# Patient Record
Sex: MALE | Race: WHITE | NOT HISPANIC OR LATINO | Employment: OTHER | URBAN - NONMETROPOLITAN AREA
[De-identification: names, ages, dates, MRNs, and addresses within clinical notes are randomized per-mention and may not be internally consistent; named-entity substitution may affect disease eponyms.]

---

## 2017-10-02 ENCOUNTER — TRANSCRIBE ORDERS (OUTPATIENT)
Dept: URGENT CARE | Facility: CLINIC | Age: 72
End: 2017-10-02

## 2017-10-02 ENCOUNTER — APPOINTMENT (OUTPATIENT)
Dept: RADIOLOGY | Facility: CLINIC | Age: 72
End: 2017-10-02
Payer: MEDICARE

## 2017-10-02 DIAGNOSIS — R05.9 COUGH: Primary | ICD-10-CM

## 2017-10-02 DIAGNOSIS — R05.9 COUGH: ICD-10-CM

## 2017-10-02 PROCEDURE — 71020 HB CHEST X-RAY 2VW FRONTAL&LATL: CPT

## 2018-09-11 ENCOUNTER — TRANSCRIBE ORDERS (OUTPATIENT)
Dept: LAB | Facility: CLINIC | Age: 73
End: 2018-09-11

## 2018-09-11 ENCOUNTER — APPOINTMENT (OUTPATIENT)
Dept: LAB | Facility: CLINIC | Age: 73
End: 2018-09-11
Payer: MEDICARE

## 2018-09-11 DIAGNOSIS — I10 ESSENTIAL HYPERTENSION, MALIGNANT: ICD-10-CM

## 2018-09-11 DIAGNOSIS — E78.5 HYPERLIPIDEMIA, UNSPECIFIED HYPERLIPIDEMIA TYPE: ICD-10-CM

## 2018-09-11 DIAGNOSIS — R35.1 NOCTURIA: ICD-10-CM

## 2018-09-11 DIAGNOSIS — E78.5 HYPERLIPIDEMIA, UNSPECIFIED HYPERLIPIDEMIA TYPE: Primary | ICD-10-CM

## 2018-09-11 LAB
ALBUMIN SERPL BCP-MCNC: 3.3 G/DL (ref 3.5–5)
ALP SERPL-CCNC: 47 U/L (ref 46–116)
ALT SERPL W P-5'-P-CCNC: 53 U/L (ref 12–78)
ANION GAP SERPL CALCULATED.3IONS-SCNC: 4 MMOL/L (ref 4–13)
AST SERPL W P-5'-P-CCNC: 30 U/L (ref 5–45)
BILIRUB SERPL-MCNC: 0.39 MG/DL (ref 0.2–1)
BUN SERPL-MCNC: 25 MG/DL (ref 5–25)
CALCIUM SERPL-MCNC: 8.6 MG/DL (ref 8.3–10.1)
CHLORIDE SERPL-SCNC: 105 MMOL/L (ref 100–108)
CHOLEST SERPL-MCNC: 157 MG/DL (ref 50–200)
CO2 SERPL-SCNC: 27 MMOL/L (ref 21–32)
CREAT SERPL-MCNC: 1.13 MG/DL (ref 0.6–1.3)
ERYTHROCYTE [DISTWIDTH] IN BLOOD BY AUTOMATED COUNT: 11.9 % (ref 11.6–15.1)
GFR SERPL CREATININE-BSD FRML MDRD: 65 ML/MIN/1.73SQ M
GLUCOSE SERPL-MCNC: 75 MG/DL (ref 65–140)
HCT VFR BLD AUTO: 44.4 % (ref 36.5–49.3)
HDLC SERPL-MCNC: 32 MG/DL (ref 40–60)
HGB BLD-MCNC: 15.1 G/DL (ref 12–17)
LDLC SERPL DIRECT ASSAY-MCNC: 69 MG/DL (ref 0–100)
MCH RBC QN AUTO: 33.8 PG (ref 26.8–34.3)
MCHC RBC AUTO-ENTMCNC: 34 G/DL (ref 31.4–37.4)
MCV RBC AUTO: 99 FL (ref 82–98)
PLATELET # BLD AUTO: 229 THOUSANDS/UL (ref 149–390)
PMV BLD AUTO: 10.3 FL (ref 8.9–12.7)
POTASSIUM SERPL-SCNC: 5 MMOL/L (ref 3.5–5.3)
PROT SERPL-MCNC: 7.1 G/DL (ref 6.4–8.2)
PSA SERPL-MCNC: 1.6 NG/ML (ref 0–4)
RBC # BLD AUTO: 4.47 MILLION/UL (ref 3.88–5.62)
SODIUM SERPL-SCNC: 136 MMOL/L (ref 136–145)
TRIGL SERPL-MCNC: 405 MG/DL
WBC # BLD AUTO: 5.37 THOUSAND/UL (ref 4.31–10.16)

## 2018-09-11 PROCEDURE — 80061 LIPID PANEL: CPT

## 2018-09-11 PROCEDURE — 83721 ASSAY OF BLOOD LIPOPROTEIN: CPT

## 2018-09-11 PROCEDURE — 85027 COMPLETE CBC AUTOMATED: CPT

## 2018-09-11 PROCEDURE — 80053 COMPREHEN METABOLIC PANEL: CPT

## 2018-09-11 PROCEDURE — 84153 ASSAY OF PSA TOTAL: CPT

## 2018-09-11 PROCEDURE — 36415 COLL VENOUS BLD VENIPUNCTURE: CPT

## 2020-09-14 PROCEDURE — 88305 TISSUE EXAM BY PATHOLOGIST: CPT | Performed by: PATHOLOGY

## 2021-01-20 ENCOUNTER — IMMUNIZATIONS (OUTPATIENT)
Dept: FAMILY MEDICINE CLINIC | Facility: HOSPITAL | Age: 76
End: 2021-01-20

## 2021-01-20 DIAGNOSIS — Z23 ENCOUNTER FOR IMMUNIZATION: Primary | ICD-10-CM

## 2021-01-20 PROCEDURE — 0011A SARS-COV-2 / COVID-19 MRNA VACCINE (MODERNA) 100 MCG: CPT

## 2021-01-20 PROCEDURE — 91301 SARS-COV-2 / COVID-19 MRNA VACCINE (MODERNA) 100 MCG: CPT

## 2021-02-17 ENCOUNTER — IMMUNIZATIONS (OUTPATIENT)
Dept: FAMILY MEDICINE CLINIC | Facility: HOSPITAL | Age: 76
End: 2021-02-17

## 2021-02-17 DIAGNOSIS — Z23 ENCOUNTER FOR IMMUNIZATION: Primary | ICD-10-CM

## 2021-02-17 PROCEDURE — 0012A SARS-COV-2 / COVID-19 MRNA VACCINE (MODERNA) 100 MCG: CPT

## 2021-02-17 PROCEDURE — 91301 SARS-COV-2 / COVID-19 MRNA VACCINE (MODERNA) 100 MCG: CPT

## 2021-03-01 ENCOUNTER — APPOINTMENT (OUTPATIENT)
Dept: RADIOLOGY | Facility: MEDICAL CENTER | Age: 76
End: 2021-03-01
Payer: MEDICARE

## 2021-03-01 DIAGNOSIS — M54.2 NECK PAIN: ICD-10-CM

## 2021-03-01 PROCEDURE — 72050 X-RAY EXAM NECK SPINE 4/5VWS: CPT

## 2021-06-08 ENCOUNTER — APPOINTMENT (EMERGENCY)
Dept: CT IMAGING | Facility: HOSPITAL | Age: 76
End: 2021-06-08
Payer: MEDICARE

## 2021-06-08 ENCOUNTER — APPOINTMENT (EMERGENCY)
Dept: RADIOLOGY | Facility: HOSPITAL | Age: 76
End: 2021-06-08
Payer: MEDICARE

## 2021-06-08 ENCOUNTER — HOSPITAL ENCOUNTER (EMERGENCY)
Facility: HOSPITAL | Age: 76
End: 2021-06-08
Attending: EMERGENCY MEDICINE | Admitting: EMERGENCY MEDICINE
Payer: MEDICARE

## 2021-06-08 ENCOUNTER — HOSPITAL ENCOUNTER (INPATIENT)
Facility: HOSPITAL | Age: 76
LOS: 2 days | Discharge: HOME | DRG: 185 | End: 2021-06-10
Attending: SURGERY | Admitting: SURGERY
Payer: MEDICARE

## 2021-06-08 VITALS
RESPIRATION RATE: 20 BRPM | OXYGEN SATURATION: 96 % | BODY MASS INDEX: 27.81 KG/M2 | TEMPERATURE: 97.5 F | SYSTOLIC BLOOD PRESSURE: 126 MMHG | DIASTOLIC BLOOD PRESSURE: 67 MMHG | HEIGHT: 66 IN | WEIGHT: 173.06 LBS | HEART RATE: 61 BPM

## 2021-06-08 DIAGNOSIS — S22.41XA CLOSED FRACTURE OF MULTIPLE RIBS OF RIGHT SIDE, INITIAL ENCOUNTER: Primary | ICD-10-CM

## 2021-06-08 DIAGNOSIS — S27.321A CONTUSION OF RIGHT LUNG, INITIAL ENCOUNTER: Primary | ICD-10-CM
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
## 2021-06-08 DIAGNOSIS — S22.41XA CLOSED FRACTURE OF MULTIPLE RIBS OF RIGHT SIDE, INITIAL ENCOUNTER: ICD-10-CM

## 2021-06-08 PROBLEM — G89.11 ACUTE PAIN DUE TO TRAUMA: Status: ACTIVE | Noted: 2021-06-08

## 2021-06-08 LAB
ANION GAP SERPL CALCULATED.3IONS-SCNC: 7 MMOL/L (ref 4–13)
APTT PPP: 24 SECONDS (ref 23–37)
BASOPHILS # BLD AUTO: 0.03 THOUSANDS/ΜL (ref 0–0.1)
BASOPHILS NFR BLD AUTO: 1 % (ref 0–1)
BUN SERPL-MCNC: 29 MG/DL (ref 5–25)
CALCIUM SERPL-MCNC: 8.8 MG/DL (ref 8.3–10.1)
CHLORIDE SERPL-SCNC: 105 MMOL/L (ref 100–108)
CO2 SERPL-SCNC: 27 MMOL/L (ref 21–32)
CREAT SERPL-MCNC: 1.23 MG/DL (ref 0.6–1.3)
EOSINOPHIL # BLD AUTO: 0.14 THOUSAND/ΜL (ref 0–0.61)
EOSINOPHIL NFR BLD AUTO: 3 % (ref 0–6)
ERYTHROCYTE [DISTWIDTH] IN BLOOD BY AUTOMATED COUNT: 12.4 % (ref 11.6–15.1)
GFR SERPL CREATININE-BSD FRML MDRD: 58 ML/MIN/1.73SQ M
GLUCOSE SERPL-MCNC: 185 MG/DL (ref 65–140)
HCT VFR BLD AUTO: 44.6 % (ref 36.5–49.3)
HGB BLD-MCNC: 15 G/DL (ref 12–17)
IMM GRANULOCYTES # BLD AUTO: 0.02 THOUSAND/UL (ref 0–0.2)
IMM GRANULOCYTES NFR BLD AUTO: 0 % (ref 0–2)
INR PPP: 0.93 (ref 0.84–1.19)
LYMPHOCYTES # BLD AUTO: 1.39 THOUSANDS/ΜL (ref 0.6–4.47)
LYMPHOCYTES NFR BLD AUTO: 27 % (ref 14–44)
MCH RBC QN AUTO: 33 PG (ref 26.8–34.3)
MCHC RBC AUTO-ENTMCNC: 33.6 G/DL (ref 31.4–37.4)
MCV RBC AUTO: 98 FL (ref 82–98)
MONOCYTES # BLD AUTO: 0.56 THOUSAND/ΜL (ref 0.17–1.22)
MONOCYTES NFR BLD AUTO: 11 % (ref 4–12)
NEUTROPHILS # BLD AUTO: 3.06 THOUSANDS/ΜL (ref 1.85–7.62)
NEUTS SEG NFR BLD AUTO: 58 % (ref 43–75)
NRBC BLD AUTO-RTO: 0 /100 WBCS
PLATELET # BLD AUTO: 221 THOUSANDS/UL (ref 149–390)
PMV BLD AUTO: 9.9 FL (ref 8.9–12.7)
POTASSIUM SERPL-SCNC: 4.3 MMOL/L (ref 3.5–5.3)
PROTHROMBIN TIME: 12.3 SECONDS (ref 11.6–14.5)
RBC # BLD AUTO: 4.54 MILLION/UL (ref 3.88–5.62)
SARS-COV-2 RNA RESP QL NAA+PROBE: NEGATIVE
SODIUM SERPL-SCNC: 139 MMOL/L (ref 136–145)
WBC # BLD AUTO: 5.2 THOUSAND/UL (ref 4.31–10.16)

## 2021-06-08 PROCEDURE — 85025 COMPLETE CBC W/AUTO DIFF WBC: CPT | Performed by: EMERGENCY MEDICINE

## 2021-06-08 PROCEDURE — 85730 THROMBOPLASTIN TIME PARTIAL: CPT | Performed by: EMERGENCY MEDICINE

## 2021-06-08 PROCEDURE — 71045 X-RAY EXAM CHEST 1 VIEW: CPT

## 2021-06-08 PROCEDURE — 99285 EMERGENCY DEPT VISIT HI MDM: CPT

## 2021-06-08 PROCEDURE — 90715 TDAP VACCINE 7 YRS/> IM: CPT | Performed by: PHYSICIAN ASSISTANT

## 2021-06-08 PROCEDURE — 80048 BASIC METABOLIC PNL TOTAL CA: CPT | Performed by: EMERGENCY MEDICINE

## 2021-06-08 PROCEDURE — U0003 INFECTIOUS AGENT DETECTION BY NUCLEIC ACID (DNA OR RNA); SEVERE ACUTE RESPIRATORY SYNDROME CORONAVIRUS 2 (SARS-COV-2) (CORONAVIRUS DISEASE [COVID-19]), AMPLIFIED PROBE TECHNIQUE, MAKING USE OF HIGH THROUGHPUT TECHNOLOGIES AS DESCRIBED BY CMS-2020-01-R: HCPCS | Performed by: EMERGENCY MEDICINE

## 2021-06-08 PROCEDURE — 99222 1ST HOSP IP/OBS MODERATE 55: CPT | Performed by: SURGERY

## 2021-06-08 PROCEDURE — 74177 CT ABD & PELVIS W/CONTRAST: CPT

## 2021-06-08 PROCEDURE — 99222 1ST HOSP IP/OBS MODERATE 55: CPT | Performed by: NURSE PRACTITIONER

## 2021-06-08 PROCEDURE — U0005 INFEC AGEN DETEC AMPLI PROBE: HCPCS | Performed by: EMERGENCY MEDICINE

## 2021-06-08 PROCEDURE — 96374 THER/PROPH/DIAG INJ IV PUSH: CPT

## 2021-06-08 PROCEDURE — 85610 PROTHROMBIN TIME: CPT | Performed by: EMERGENCY MEDICINE

## 2021-06-08 PROCEDURE — 36415 COLL VENOUS BLD VENIPUNCTURE: CPT | Performed by: EMERGENCY MEDICINE

## 2021-06-08 PROCEDURE — 96375 TX/PRO/DX INJ NEW DRUG ADDON: CPT

## 2021-06-08 PROCEDURE — 99291 CRITICAL CARE FIRST HOUR: CPT | Performed by: EMERGENCY MEDICINE

## 2021-06-08 PROCEDURE — 71260 CT THORAX DX C+: CPT

## 2021-06-08 RX ORDER — METHOCARBAMOL 500 MG/1
500 TABLET, FILM COATED ORAL EVERY 6 HOURS SCHEDULED
Status: DISCONTINUED | OUTPATIENT
Start: 2021-06-08 | End: 2021-06-08

## 2021-06-08 RX ORDER — ONDANSETRON 2 MG/ML
4 INJECTION INTRAMUSCULAR; INTRAVENOUS EVERY 4 HOURS PRN
Status: DISCONTINUED | OUTPATIENT
Start: 2021-06-08 | End: 2021-06-10 | Stop reason: HOSPADM

## 2021-06-08 RX ORDER — OXYCODONE HYDROCHLORIDE 5 MG/1
2.5 TABLET ORAL EVERY 4 HOURS PRN
Status: DISCONTINUED | OUTPATIENT
Start: 2021-06-08 | End: 2021-06-10 | Stop reason: HOSPADM

## 2021-06-08 RX ORDER — GABAPENTIN 100 MG/1
100 CAPSULE ORAL
Status: DISCONTINUED | OUTPATIENT
Start: 2021-06-08 | End: 2021-06-10 | Stop reason: HOSPADM

## 2021-06-08 RX ORDER — POLYETHYLENE GLYCOL 3350 17 G/17G
17 POWDER, FOR SOLUTION ORAL DAILY PRN
Status: DISCONTINUED | OUTPATIENT
Start: 2021-06-08 | End: 2021-06-10 | Stop reason: HOSPADM

## 2021-06-08 RX ORDER — MELATONIN
2000 DAILY
Status: DISCONTINUED | OUTPATIENT
Start: 2021-06-08 | End: 2021-06-10 | Stop reason: HOSPADM

## 2021-06-08 RX ORDER — LISINOPRIL 20 MG/1
20 TABLET ORAL DAILY
Status: DISCONTINUED | OUTPATIENT
Start: 2021-06-08 | End: 2021-06-10 | Stop reason: HOSPADM

## 2021-06-08 RX ORDER — METHOCARBAMOL 500 MG/1
500 TABLET, FILM COATED ORAL EVERY 8 HOURS SCHEDULED
Status: DISCONTINUED | OUTPATIENT
Start: 2021-06-08 | End: 2021-06-10 | Stop reason: HOSPADM

## 2021-06-08 RX ORDER — ACETAMINOPHEN 325 MG/1
975 TABLET ORAL EVERY 8 HOURS SCHEDULED
Status: DISCONTINUED | OUTPATIENT
Start: 2021-06-08 | End: 2021-06-10 | Stop reason: HOSPADM

## 2021-06-08 RX ORDER — PANTOPRAZOLE SODIUM 40 MG/1
40 TABLET, DELAYED RELEASE ORAL
Status: DISCONTINUED | OUTPATIENT
Start: 2021-06-09 | End: 2021-06-10 | Stop reason: HOSPADM

## 2021-06-08 RX ORDER — FENTANYL CITRATE 50 UG/ML
75 INJECTION, SOLUTION INTRAMUSCULAR; INTRAVENOUS ONCE
Status: COMPLETED | OUTPATIENT
Start: 2021-06-08 | End: 2021-06-08

## 2021-06-08 RX ORDER — LIDOCAINE 50 MG/G
1 PATCH TOPICAL DAILY
Status: COMPLETED | OUTPATIENT
Start: 2021-06-08 | End: 2021-06-09

## 2021-06-08 RX ORDER — GABAPENTIN 100 MG/1
100 CAPSULE ORAL 3 TIMES DAILY
Status: DISCONTINUED | OUTPATIENT
Start: 2021-06-08 | End: 2021-06-08

## 2021-06-08 RX ORDER — AMLODIPINE BESYLATE 5 MG/1
5 TABLET ORAL DAILY
Status: DISCONTINUED | OUTPATIENT
Start: 2021-06-08 | End: 2021-06-10 | Stop reason: HOSPADM

## 2021-06-08 RX ORDER — AMOXICILLIN 250 MG
2 CAPSULE ORAL DAILY
Status: DISCONTINUED | OUTPATIENT
Start: 2021-06-09 | End: 2021-06-10 | Stop reason: HOSPADM

## 2021-06-08 RX ORDER — HYDROMORPHONE HCL IN WATER/PF 6 MG/30 ML
0.2 PATIENT CONTROLLED ANALGESIA SYRINGE INTRAVENOUS
Status: DISCONTINUED | OUTPATIENT
Start: 2021-06-08 | End: 2021-06-09

## 2021-06-08 RX ORDER — OXYCODONE HYDROCHLORIDE 5 MG/1
5 TABLET ORAL EVERY 4 HOURS PRN
Status: DISCONTINUED | OUTPATIENT
Start: 2021-06-08 | End: 2021-06-10 | Stop reason: HOSPADM

## 2021-06-08 RX ORDER — HYDROMORPHONE HCL/PF 1 MG/ML
1 SYRINGE (ML) INJECTION ONCE
Status: COMPLETED | OUTPATIENT
Start: 2021-06-08 | End: 2021-06-08

## 2021-06-08 RX ADMIN — LISINOPRIL 20 MG: 20 TABLET ORAL at 18:20

## 2021-06-08 RX ADMIN — METHOCARBAMOL 500 MG: 500 TABLET ORAL at 14:33

## 2021-06-08 RX ADMIN — ACETAMINOPHEN 975 MG: 325 TABLET, FILM COATED ORAL at 21:06

## 2021-06-08 RX ADMIN — ACETAMINOPHEN 975 MG: 325 TABLET, FILM COATED ORAL at 14:33

## 2021-06-08 RX ADMIN — OXYCODONE HYDROCHLORIDE 5 MG: 5 TABLET ORAL at 18:32

## 2021-06-08 RX ADMIN — LIDOCAINE 5% 1 PATCH: 700 PATCH TOPICAL at 14:33

## 2021-06-08 RX ADMIN — GABAPENTIN 100 MG: 100 CAPSULE ORAL at 21:07

## 2021-06-08 RX ADMIN — TETANUS TOXOID, REDUCED DIPHTHERIA TOXOID AND ACELLULAR PERTUSSIS VACCINE, ADSORBED 0.5 ML: 5; 2.5; 8; 8; 2.5 SUSPENSION INTRAMUSCULAR at 14:35

## 2021-06-08 RX ADMIN — FENTANYL CITRATE 75 MCG: 50 INJECTION, SOLUTION INTRAMUSCULAR; INTRAVENOUS at 10:21

## 2021-06-08 RX ADMIN — METHOCARBAMOL 500 MG: 500 TABLET, FILM COATED ORAL at 21:07

## 2021-06-08 RX ADMIN — HYDROMORPHONE HYDROCHLORIDE 1 MG: 1 INJECTION, SOLUTION INTRAMUSCULAR; INTRAVENOUS; SUBCUTANEOUS at 11:13

## 2021-06-08 RX ADMIN — ENOXAPARIN SODIUM 30 MG: 30 INJECTION, SOLUTION INTRAVENOUS; SUBCUTANEOUS at 14:33

## 2021-06-08 RX ADMIN — IOHEXOL 100 ML: 350 INJECTION, SOLUTION INTRAVENOUS at 10:38

## 2021-06-08 NOTE — H&P
[facility]  H&P- Juany Frost 1945, 70 y o  male MRN: [id number]  Unit/Bed#: ED 28 Encounter: [id number]  Primary Care Provider: Bjorn Mcgrath MD   Date and time admitted to hospital: 6/8/2021  1:19 PM    Fall  Assessment & Plan  - Status post mechanical fall approximately 4 feet onto right side with the below noted injuries  - Fall precautions   - PT and OT evaluation and treatment as indicated  - Case Management consultation for disposition planning  * Closed fracture of multiple ribs of right side  Assessment & Plan  - Multiple right-sided rib fractures (nondisplaced 7th & mildly displaced 9-10), present on admission   - Continue rib fracture protocol   - Continue to encourage incentive spirometer use and adequate pulmonary hygiene  Currently pulling ** mL on I S   - Continue multimodal analgesic regimen  Appreciate APS evaluation and recommendations   - Supplemental oxygen via nasal cannula as needed to maintain saturations greater than or equal to 94%  - Plan for repeat chest x-ray on 6/9/2021    - PT and OT evaluation and treatment as indicated  H&P Exam - Trauma   Juany Frost 70 y o  male MRN: [id number]  Unit/Bed#: ED 28 Encounter: [id number]    Assessment/Plan   Trauma Alert: Evaluation; Transfer from [place] [address]  of Arrival: Ambulance  Trauma Team: Attending Dr Tawnya Molina and Lucho Baker PA-C  Consultants:   Acute Pain Service contacted on 06/08/2021 at 1:50 p m  Trauma Active Problems and Trauma Plan: See above  Chief Complaint:  "My right side hurts "    History of Present Illness   HPI:  Juany Frost is a 70 y o  male who presented to [facility] with right chest wall 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  He was working with a hay bale when it kicked out from underneath him and he fell approximately 4 ft onto some farm equipment striking his right side  He denied head strike or loss of consciousness    He was able to ambulate and get to his neighbor's house for help  Since his fall, he has had pain in his right side and lateral back as well as occasional shortness of breath  He denied any other traumatic event or other complaints at this time  Mechanism:Fall    Review of Systems   Constitutional: Negative for activity change, appetite change, chills, fatigue, fever and unexpected weight change  HENT: Negative for congestion, ear pain, facial swelling and sore throat  Eyes: Negative  Negative for photophobia, pain and visual disturbance  Respiratory: Negative  Negative for cough, chest tightness, shortness of breath and wheezing  Cardiovascular: Positive for chest pain (Right lateral and posterior chest wall pain)  Negative for leg swelling  Gastrointestinal: Negative  Negative for abdominal distention, abdominal pain, constipation, diarrhea, nausea and vomiting  Endocrine: Negative  Genitourinary: Negative  Negative for difficulty urinating, dysuria, flank pain, frequency and hematuria  Musculoskeletal: Positive for arthralgias (Mild right shoulder pain, may be chronic)  Negative for back pain and neck pain  Skin: Positive for wound (Right flank/lateral back abrasion)  Negative for color change, pallor and rash  Allergic/Immunologic: Negative  Neurological: Negative  Negative for dizziness, syncope, weakness, light-headedness, numbness and headaches  Hematological: Negative  Psychiatric/Behavioral: Negative  Negative for agitation, confusion and self-injury  The patient is not nervous/anxious  12-point, complete review of systems was reviewed and negative except as stated above         Historical Information   Efforts to obtain history included the following sources: other medical personnel, obtained from other records    Past Medical History:   Diagnosis Date    GERD (gastroesophageal reflux disease)     Hypertension      Past Surgical History:   Procedure Laterality Date  KNEE ARTHROSCOPY       Social History   Social History     Substance and Sexual Activity   Alcohol Use Yes    Comment: 1-2 drinks a month     Social History     Substance and Sexual Activity   Drug Use Never     Social History     Tobacco Use   Smoking Status Never Smoker   Smokeless Tobacco Never Used     E-Cigarette/Vaping    E-Cigarette Use Never User      E-Cigarette/Vaping Substances    Nicotine No     THC No     CBD No     Flavoring No     Other No     Unknown No      Immunization History   Administered Date(s) Administered    SARS-CoV-2 / COVID-19 mRNA IM (Michael Hamilton) 01/20/2021, 02/17/2021     Last Tetanus: Unknown, will update  Family History: Non-contributory  Unable to obtain/limited by N/A      Meds/Allergies   all current active meds have been reviewed and current meds:   No current facility-administered medications for this encounter  Allergies   Allergen Reactions    Sulfa Antibiotics      As a child         PHYSICAL EXAM    PE limited by: N/A    Objective   Vitals:   First set:      Primary Survey:   (A) Airway:  Patent and intact  (B) Breathing:  Clear and equal bilaterally  (C) Circulation: Pulses:   normal, carotid  2/4, pedal  2/4, radial  2/4 and femoral  2/4  (D) Disabliity:  GCS Total:  15, Eye Opening:   Spontaneous = 4, Motor Response: Obeys commands = 6 and Verbal Response:  Oriented = 5  (E) Expose:  Completed    Secondary Survey: (Click on Physical Exam tab above)  Physical Exam  Vitals signs and nursing note reviewed  Constitutional:       General: He is awake  He is not in acute distress  Appearance: Normal appearance  He is not ill-appearing, toxic-appearing or diaphoretic  Interventions: He is not intubated  Comments: Patient did appear mildly uncomfortable secondary to right chest wall pain  HENT:      Head: Normocephalic and atraumatic  No abrasion, contusion or laceration  Jaw: There is normal jaw occlusion        Right Ear: Hearing and external ear normal  No tenderness  Left Ear: Hearing and external ear normal  No tenderness  Nose: Nose normal  No nasal deformity, septal deviation, signs of injury, laceration, nasal tenderness or congestion  Mouth/Throat:      Mouth: Mucous membranes are moist  No injury  Dentition: Normal dentition  Pharynx: Oropharynx is clear  Eyes:      General: Lids are normal       Extraocular Movements: Extraocular movements intact  Conjunctiva/sclera: Conjunctivae normal       Pupils: Pupils are equal, round, and reactive to light  Comments:  Pupils were 3 mm, equal, round and reactive bilaterally   Neck:      Musculoskeletal: Normal range of motion and neck supple  Normal range of motion  No injury, spinous process tenderness or muscular tenderness  Cardiovascular:      Rate and Rhythm: Normal rate and regular rhythm  Pulses: Normal pulses  Carotid pulses are 2+ on the right side and 2+ on the left side  Radial pulses are 2+ on the right side and 2+ on the left side  Femoral pulses are 2+ on the right side and 2+ on the left side  Dorsalis pedis pulses are 2+ on the right side and 2+ on the left side  Heart sounds: Normal heart sounds  No murmur  No friction rub  No gallop  Pulmonary:      Effort: Pulmonary effort is normal  No tachypnea, bradypnea, accessory muscle usage or respiratory distress  He is not intubated  Breath sounds: Normal breath sounds and air entry  No stridor or decreased air movement  No decreased breath sounds, wheezing, rhonchi or rales  Chest:      Chest wall: Tenderness (Right lateral and posterior chest wall tenderness) present  No lacerations, deformity, swelling or crepitus  Abdominal:      General: Abdomen is flat  Bowel sounds are normal  There is no distension  Palpations: Abdomen is soft  Tenderness: There is no abdominal tenderness  There is no guarding or rebound     Musculoskeletal: Normal range of motion  General: No swelling, tenderness, deformity or signs of injury  Right shoulder: He exhibits normal range of motion, no tenderness, no swelling and no deformity  Cervical back: He exhibits no tenderness, no swelling, no deformity and no pain  Thoracic back: He exhibits no tenderness, no swelling, no deformity and no pain  Lumbar back: He exhibits no tenderness, no swelling, no deformity and no pain  Right lower leg: No edema  Left lower leg: No edema  Skin:     General: Skin is warm and dry  Coloration: Skin is not jaundiced or pale  Findings: Abrasion (Right flank and lateral back abrasions with minimal tenderness over posterior chest wall) present  No bruising, erythema, lesion or rash  Neurological:      General: No focal deficit present  Mental Status: He is alert and oriented to person, place, and time  Mental status is at baseline  GCS: GCS eye subscore is 4  GCS verbal subscore is 5  GCS motor subscore is 6  Sensory: Sensation is intact  No sensory deficit  Motor: Motor function is intact  No weakness  Psychiatric:         Mood and Affect: Mood normal          Behavior: Behavior is cooperative           Invasive Devices     Peripheral Intravenous Line            Peripheral IV 06/08/21 Right Antecubital less than 1 day                Lab Results:   Results: I have personally reviewed pertinent reports   , BMP/CMP:   Lab Results   Component Value Date    SODIUM 139 06/08/2021    K 4 3 06/08/2021     06/08/2021    CO2 27 06/08/2021    BUN 29 (H) 06/08/2021    CREATININE 1 23 06/08/2021    CALCIUM 8 8 06/08/2021    EGFR 58 06/08/2021   , CBC:   Lab Results   Component Value Date    WBC 5 20 06/08/2021    HGB 15 0 06/08/2021    HCT 44 6 06/08/2021    MCV 98 06/08/2021     06/08/2021    MCH 33 0 06/08/2021    MCHC 33 6 06/08/2021    RDW 12 4 06/08/2021    MPV 9 9 06/08/2021    NRBC 0 06/08/2021    and Coagulation: Lab Results   Component Value Date    INR 0 93 06/08/2021     Imaging/EKG Studies: Results: I have personally reviewed pertinent reports     and I have personally reviewed pertinent films in PACS  Other Studies: N/A    Code Status: No Order  Advance Directive and Living Will:      Power of :    POLST:

## 2021-06-08 NOTE — ASSESSMENT & PLAN NOTE
- Multiple right-sided rib fractures (nondisplaced 7th & mildly displaced 9-10), present on admission   - Continue rib fracture protocol   - Continue to encourage incentive spirometer use and adequate pulmonary hygiene  Currently pulling 1,250 mL on I S   - Continue multimodal analgesic regimen  Appreciate APS evaluation and recommendations   - Supplemental oxygen via nasal cannula as needed to maintain saturations greater than or equal to 94%  - Plan for repeat chest x-ray on 6/9/2021    - PT and OT evaluation and treatment as indicated

## 2021-06-08 NOTE — ED PROVIDER NOTES
Emergency Department Trauma Note  Grover Howard 70 y o  male MRN: [id number]  Unit/Bed#: RM10/RM10 Encounter: [id number]      Trauma Alert: Trauma Acuity: Trauma Evaluation  Model of Arrival:   via    Trauma Team: Current Providers  Attending Provider: Shilpi Phillips DO  Registered Nurse: Eliana Chandler RN  Consultants: None      History of Present Illness     Chief Complaint:   Chief Complaint   Patient presents with    Trauma     patient reports falling about 4foot off a haybail  he fell onto right side  pain in right ribs and flank area with abrasions  HPI:  Grover Howard is a 70 y o  male who presents with right-sided back and flank pain after a hay bale he was standing on kicked out causing him to fall approximately 4 ft onto farm machinery  He denies head injury and loss of consciousness  He had no prodromal symptoms  Fall was witnessed  He has abrasions over his right flank significant pain from the right shoulder blade down to his waist   He denies any other associated symptoms at this time  Mechanism:Details of Incident: patient fell 4 foot onto right side   pain in left ribs and flank Injury Date: 06/08/21 Injury Time: 0945 Injury Occurence Location - [address] [place] [address] Way: [facility]      History provided by:  Patient and medical records  Fall  Mechanism of injury: fall    Injury location:  Torso  Torso injury location:  R flank and R chest  Incident location:  Around machinery  Time since incident:  30 minutes  Arrived directly from scene: yes    Fall:     Height of fall:  4-6 feet    Entrapped after fall: no    Protective equipment: none    Suspicion of alcohol use: no    Suspicion of drug use: no    Prior to arrival data:     Blood loss:  None    Responsiveness at scene:  Alert    Orientation at scene:  Person, place, situation and time    Loss of consciousness: no      Amnesic to event: no      Airway interventions:  None    Breathing interventions:  None    Immobilization:  None    Airway condition since incident:  Stable    Breathing condition since incident:  Stable    Circulation condition since incident:  Stable    Mental status condition since incident:  Stable    Disability condition since incident:  Stable  Associated symptoms: back pain (right flank), chest pain (right lateral) and difficulty breathing (due to pain)    Associated symptoms: no abdominal pain, no headaches, no loss of consciousness, no nausea, no neck pain and no vomiting    Risk factors: no anticoagulation therapy      Review of Systems   Constitutional: Negative for fever  Was ambulatory at the scene  HENT: Negative  Eyes: Negative  Respiratory: Negative for cough, chest tightness and shortness of breath  Cardiovascular: Positive for chest pain (right lateral)  Negative for palpitations and leg swelling  Gastrointestinal: Negative for abdominal pain, nausea and vomiting  Genitourinary: Negative for flank pain and hematuria  Musculoskeletal: Positive for back pain (right flank)  Negative for arthralgias, myalgias, neck pain and neck stiffness  Skin: Positive for wound (mild abrasions to right flank)  Negative for pallor  Neurological: Negative for dizziness, loss of consciousness, syncope, light-headedness, numbness and headaches  Psychiatric/Behavioral: Negative for confusion  The patient is not nervous/anxious  All other systems reviewed and are negative        Historical Information     Immunizations:   Immunization History   Administered Date(s) Administered    SARS-CoV-2 / COVID-19 mRNA IM (Derrick Aponte) 01/20/2021, 02/17/2021       Past Medical History:   Diagnosis Date    GERD (gastroesophageal reflux disease)     Hypertension        Family History   Problem Relation Age of Onset    Hypertension Father     Diabetes Father      Past Surgical History:   Procedure Laterality Date    KNEE ARTHROSCOPY       Social History     Tobacco Use    Smoking status: Never Smoker    Smokeless tobacco: Never Used   Substance Use Topics    Alcohol use: Yes     Comment: 1-2 drinks a month    Drug use: Never     E-Cigarette/Vaping    E-Cigarette Use Never User      E-Cigarette/Vaping Substances    Nicotine No     THC No     CBD No     Flavoring No     Other No     Unknown No        Family History: non-contributory    Meds/Allergies   Prior to Admission Medications   Prescriptions Last Dose Informant Patient Reported? Taking? [facility] [place]-3 Krill Oil 300 MG CAPS  Self Yes No   Sig: Take by mouth   amLODIPine (NORVASC) 5 mg tablet  Self Yes No   Sig: Take 5 mg by mouth daily   cholecalciferol (VITAMIN D3) 1,000 units tablet  Self Yes No   Sig: Take 2,000 Units by mouth daily   lisinopril (ZESTRIL) 10 mg tablet  Self Yes No   Sig: Take 20 mg by mouth daily    nystatin-triamcinolone (MYCOLOG-II) ointment  Self Yes No   Si (two) times a day Apply sparingly to affected area   omeprazole (PriLOSEC) 20 mg delayed release capsule  Self Yes No   Sig: Take 20 mg by mouth daily      Facility-Administered Medications: None       Allergies   Allergen Reactions    Sulfa Antibiotics      As a child       PHYSICAL EXAM    PE limited by: nothing    Objective   Vitals:   First set: Temperature: 97 5 °F (36 4 °C) (21 1010)  Pulse: (!) 54 (21 1010)  Respirations: 18 (21 1010)  Blood Pressure: 161/76 (21 1010)  SpO2: 95 % (21 1010)    Primary Survey:   (A) Airway: patent, native  (B) Breathing: CTA b/l   (C) Circulation: Pulses:   normal  (D) Disabliity:  GCS Total:  15  (E) Expose:  Completed    Secondary Survey: (Click on Physical Exam tab above)  Physical Exam  Vitals signs reviewed  Constitutional:       General: He is in acute distress (moderate)  Appearance: He is well-developed  He is not diaphoretic  HENT:      Head: Normocephalic and atraumatic        Right Ear: Tympanic membrane, ear canal and external ear normal       Left Ear: Tympanic membrane, ear canal and external ear normal       Nose: Nose normal       Mouth/Throat:      Mouth: Mucous membranes are moist       Pharynx: Oropharynx is clear  No oropharyngeal exudate  Eyes:      General: No scleral icterus  Conjunctiva/sclera: Conjunctivae normal       Pupils: Pupils are equal, round, and reactive to light  Neck:      Musculoskeletal: Normal range of motion and neck supple  Comments: No midline cervical TTP, stepoff or deformity  Cardiovascular:      Rate and Rhythm: Normal rate and regular rhythm  Heart sounds: No murmur  Pulmonary:      Effort: Pulmonary effort is normal  No respiratory distress  Breath sounds: Normal breath sounds  Chest:      Chest wall: Tenderness (Right lateral chest wall) present  Abdominal:      General: Bowel sounds are normal       Palpations: Abdomen is soft  Tenderness: There is no abdominal tenderness  Musculoskeletal: Normal range of motion  General: Tenderness (Significant, right lateral chest wall flank) and signs of injury (Abrasions to right flank) present  Comments: No midline vert TTP, no crepitus or step-offs  Skin:     General: Skin is warm and dry  Findings: No bruising  Neurological:      General: No focal deficit present  Mental Status: He is alert and oriented to person, place, and time  Cranial Nerves: No cranial nerve deficit  Sensory: No sensory deficit  Motor: No weakness or abnormal muscle tone  Deep Tendon Reflexes: Reflexes are normal and symmetric  Psychiatric:         Mood and Affect: Mood normal          Behavior: Behavior normal          Thought Content: Thought content normal          Cervical spine cleared by clinical criteria?  Yes     Invasive Devices     Peripheral Intravenous Line            Peripheral IV 06/08/21 Right Antecubital less than 1 day                Lab Results:   Results Reviewed     Procedure Component Value Units Date/Time    Basic metabolic panel [[id number]] (Abnormal) Collected: 06/08/21 1116    Lab Status: Final result Specimen: Blood from Arm, Right Updated: 06/08/21 1135     Sodium 139 mmol/L      Potassium 4 3 mmol/L      Chloride 105 mmol/L      CO2 27 mmol/L      ANION GAP 7 mmol/L      BUN 29 mg/dL      Creatinine 1 23 mg/dL      Glucose 185 mg/dL      Calcium 8 8 mg/dL      eGFR 58 ml/min/1 73sq m     Narrative:      [place] guidelines for Chronic Kidney Disease (CKD):     Stage 1 with normal or high GFR (GFR > 90 mL/min/1 73 square meters)    Stage 2 Mild CKD (GFR = 60-89 mL/min/1 73 square meters)    Stage 3A Moderate CKD (GFR = 45-59 mL/min/1 73 square meters)    Stage 3B Moderate CKD (GFR = 30-44 mL/min/1 73 square meters)    Stage 4 Severe CKD (GFR = 15-29 mL/min/1 73 square meters)    Stage 5 End Stage CKD (GFR <15 mL/min/1 73 square meters)  Note: GFR calculation is accurate only with a steady state creatinine    CBC and differential [[id number]] Collected: 06/08/21 1116    Lab Status: Final result Specimen: Blood from Arm, Right Updated: 06/08/21 1123     WBC 5 20 Thousand/uL      RBC 4 54 Million/uL      Hemoglobin 15 0 g/dL      Hematocrit 44 6 %      MCV 98 fL      MCH 33 0 pg      MCHC 33 6 g/dL      RDW 12 4 %      MPV 9 9 fL      Platelets 652 Thousands/uL      nRBC 0 /100 WBCs      Neutrophils Relative 58 %      Immat GRANS % 0 %      Lymphocytes Relative 27 %      Monocytes Relative 11 %      Eosinophils Relative 3 %      Basophils Relative 1 %      Neutrophils Absolute 3 06 Thousands/µL      Immature Grans Absolute 0 02 Thousand/uL      Lymphocytes Absolute 1 39 Thousands/µL      Monocytes Absolute 0 56 Thousand/µL      Eosinophils Absolute 0 14 Thousand/µL      Basophils Absolute 0 03 Thousands/µL     Protime-INR [[id number]] Collected: 06/08/21 1116    Lab Status: In process Specimen: Blood from Arm, Right Updated: 06/08/21 1120    APTT [[id number]] Collected: 06/08/21 1116    Lab Status:  In process Specimen: Blood from Arm, Right Updated: 06/08/21 1120    Novel Coronavirus (Covid-19),PCR [facility] - 2 Hour Stat [[id number]] Collected: 06/08/21 1116    Lab Status: In process Specimen: Nares from Nose Updated: 06/08/21 1120                 Imaging Studies:   Direct to CT: No  TRAUMA - CT chest abdomen pelvis w contrast   Final Result by Tameka Delgado MD (06/08 1055)      Slightly displaced right posterior 9th and 10th rib fractures with nondisplaced 7th rib fracture  Contusion changes in the left lung peripherally  The study was marked in [facility] for immediate notification  Workstation performed: [id number]         XR Trauma chest portable    (Results Pending)         Procedures  CriticalCare Time  Performed by: Jelani Julian DO  Authorized by:  Jelani Julian DO     Critical care provider statement:     Critical care time (minutes):  30    Critical care time was exclusive of:  Separately billable procedures and treating other patients    Critical care was necessary to treat or prevent imminent or life-threatening deterioration of the following conditions:  Trauma    Critical care was time spent personally by me on the following activities:  Obtaining history from patient or surrogate, development of treatment plan with patient or surrogate, discussions with consultants, evaluation of patient's response to treatment, examination of patient, ordering and performing treatments and interventions, ordering and review of laboratory studies, ordering and review of radiographic studies, re-evaluation of patient's condition and review of old charts    I assumed direction of critical care for this patient from another provider in my specialty: no               ED Course           MDM        Disposition  [facility] Transfer: No  Final diagnoses:   Fall, initial encounter   Closed fracture of multiple ribs of right side, initial encounter   Contusion of right lung, initial encounter     Time reflects when diagnosis was documented in both MDM as applicable and the Disposition within this note     Time User Action Codes Description Comment    6/8/2021 11:24 AM Raegan Ramos Add [W19  QEVD] Fall, initial encounter     6/8/2021 11:24  [facility] [[id number]] Closed fracture of multiple ribs of right side, initial encounter     6/8/2021 11:24  [facility] [[id number]] Contusion of right lung, initial encounter     6/8/2021 11:24 AM [address],[facility]  [address], [facility] [address] [A45  WMVU] Fall, initial encounter     6/8/2021 11:24 AM [address] [[id number]] Contusion of right lung, initial encounter       ED Disposition     ED Disposition Condition Date/Time Comment    Transfer to Another [address] Jun 8, 2021 11:24 AM Vijay Wang should be transferred out to [facility] Trauma Service  Follow-up Information    None       Patient's Medications   Discharge Prescriptions    No medications on file     No discharge procedures on file      PDMP Review     None          ED Provider  Electronically Signed by         Rebecca Campbell DO  06/08/21 8219

## 2021-06-08 NOTE — TRAUMA DOCUMENTATION
Patient returned from [address]  Patient also reports right shoulder pain at this time  Dr Florence Akers aware

## 2021-06-08 NOTE — ASSESSMENT & PLAN NOTE
- Status post mechanical fall approximately 4 feet onto right side with the below noted injuries  - Fall precautions   - PT and OT evaluation and treatment as indicated  - Case Management consultation for disposition planning

## 2021-06-08 NOTE — ASSESSMENT & PLAN NOTE
- Acute pain to right chest wall injury  - Initiate multimodal analgesic regimen  - Acute Pain Service consulted for evaluation recommendations  - Will hold Lovenox after 9:00 p m  dose this evening in anticipation of possible need for epidural catheter if pain in adequately controlled with multimodal regimen
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
## 2021-06-08 NOTE — CONSULTS
Rib Fracture Consultation - Acute Pain Service   Sami Porter 70 y o  male MRN: [id number]  Unit/Bed#: ED 28 Encounter: [id number]               Assessment/Plan     Assessment:   Patient Active Problem List   Diagnosis    Fall    Closed fracture of multiple ribs of right side    Acute pain due to trauma      Saim Porter is a 70y o  year old male  Status post fall with multiple right-sided rib fractures  Plan:   · Tylenol 975 mg every 8 hours scheduled  · Decrease gabapentin 200 mg at bedtime   · Decrease Robaxin to 500 mg every 8 hours scheduled  · Okay to use muscle relaxants during hospitalization, would not send patient home with muscle relaxer  ·  lidocaine patch daily, on for 12 hours and off for 12 hours   · Oxycodone 2 5 mg every 4 hours as needed for moderate pain   · Oxycodone 5 mg every 4 hours as needed for severe pain   · Dilaudid 0 2 mg IV every 4 hours as needed for breakthrough pain    Bowel management   · Senokot S  2 tablets daily  ·  MiraLax daily as needed     Pain adequately controlled at present time, no signs of acute respiratory distress  Hold a m  dose of Lovenox pending pain management re-evaluation for possible epidural placement if pain/ respiratory status declines  Plan discussed with primary team     History of Present Illness    Admit Date:  6/8/2021  Hospital Day:  0 days  Primary Service:  Trauma  Attending Provider:  Montse Humphries DO  Reason for Consult / Principal Problem:   Acute pain due to trauma  HPI: Sami Porter is a 70 y o  male who presents  Status post fall with multiple right-sided rib fractures; slightly displaced right posterior 9th and 10th rib fractures with nondisplaced 7th rib fracture      Rib Fracture Evaluation:  Injuries:   Multiple right-sided rib fractures  Chest tube:  none  SpO2:   SPO2 RA Rest      ED from 6/8/2021 in [address] [place] [address] Emergency Department   SpO2  95 %   SpO2 Activity  --   O2 Device  --   O2 Flow Rate  -- Incentive Spirometer: >1000 mL  Platelet Count:   Results from last 7 days   Lab Units 06/08/21  1116   PLATELETS Thousands/uL 221     Coags:   Results from last 7 days   Lab Units 06/08/21  1116   INR  0 93   PROTIME seconds 12 3     Home anticoagulants:  none  DVT prophylaxis: Lovenox (enoxaparin) prophylactic BID last dose  Today at 1433    Current pain location(s):  Right chest wall  Pain Scale: 0-10  Current Analgesic regimen: In the past 24 hours received Tylenol, Dilaudid 1 mg IV, Robaxin and fentanyl 75 mcg IV  At present time patient is resting in bed, no acute distress, reporting minimal right-sided chest wall pain, no shortness of breath or difficulty breathing  No history of chronic back pain, does not take opioid pain medications at home  I have reviewed the patient's controlled substance dispensing history in the Prescription Drug Monitoring Program in compliance with the [facility] regulations before prescribing any controlled substances  Inpatient consult to Acute Pain Service (see Comments)  Consult performed by: GABRIELLE Godinez  Consult ordered by: Leticia Avitia PA-C          Review of Systems   Respiratory: Negative for shortness of breath  Cardiovascular: Positive for chest pain  Gastrointestinal: Negative for nausea and vomiting  Genitourinary: Negative for difficulty urinating  Musculoskeletal: Negative for back pain  Neurological: Negative for dizziness, weakness and headaches  All other systems reviewed and are negative        Historical Information   Past Medical History:   Diagnosis Date    GERD (gastroesophageal reflux disease)     Hypertension      Past Surgical History:   Procedure Laterality Date    KNEE ARTHROSCOPY       Social History   Social History     Substance and Sexual Activity   Alcohol Use Yes    Comment: 1-2 drinks a month     Social History     Substance and Sexual Activity   Drug Use Never     Social History     Tobacco Use   Smoking Status Never Smoker   Smokeless Tobacco Never Used     Family History: non-contributory    Meds/Allergies   all current active meds have been reviewed, current meds:   Current Facility-Administered Medications   Medication Dose Route Frequency    acetaminophen (TYLENOL) tablet 975 mg  975 mg Oral Q8H [address]    amLODIPine (NORVASC) tablet 5 mg  5 mg Oral Daily    cholecalciferol (VITAMIN D3) tablet 2,000 Units  2,000 Units Oral Daily    gabapentin (NEURONTIN) capsule 100 mg  100 mg Oral TID    HYDROmorphone HCl (DILAUDID) injection 0 2 mg  0 2 mg Intravenous Q1H PRN    lidocaine (LIDODERM) 5 % patch 1 patch  1 patch Topical Daily    lisinopril (ZESTRIL) tablet 20 mg  20 mg Oral Daily    methocarbamol (ROBAXIN) tablet 500 mg  500 mg Oral Q6H [address]    naloxone (NARCAN) 0 04 mg/mL syringe 0 04 mg  0 04 mg Intravenous Q1MIN PRN    ondansetron (ZOFRAN) injection 4 mg  4 mg Intravenous Q4H PRN    oxyCODONE (ROXICODONE) IR tablet 2 5 mg  2 5 mg Oral Q4H PRN    oxyCODONE (ROXICODONE) IR tablet 5 mg  5 mg Oral Q4H PRN    [START ON 6/9/2021] pantoprazole (PROTONIX) EC tablet 40 mg  40 mg Oral Early Morning    polyethylene glycol (MIRALAX) packet 17 g  17 g Oral Daily PRN    [START ON 6/9/2021] senna-docusate sodium (SENOKOT S) 8 6-50 mg per tablet 2 tablet  2 tablet Oral Daily    and PTA meds:   Prior to Admission Medications   Prescriptions Last Dose Informant Patient Reported? Taking?    [employer]3 Krill Oil 300 MG CAPS 6/8/2021 at Unknown time Self Yes Yes   Sig: Take by mouth   amLODIPine (NORVASC) 5 mg tablet 6/8/2021 at Unknown time Self Yes Yes   Sig: Take 5 mg by mouth daily   cholecalciferol (VITAMIN D3) 1,000 units tablet 6/8/2021 at Unknown time Self Yes Yes   Sig: Take 2,000 Units by mouth daily   lisinopril (ZESTRIL) 10 mg tablet 6/7/2021 at Unknown time Self Yes Yes   Sig: Take 20 mg by mouth daily    omeprazole (PriLOSEC) 20 mg delayed release capsule 6/8/2021 at Unknown time Self Yes Yes   Sig: Take 20 mg by mouth daily      Facility-Administered Medications: None       Allergies   Allergen Reactions    Sulfa Antibiotics      As a child       Objective   Temp:  [97 5 °F (36 4 °C)-98 1 °F (36 7 °C)] 98 1 °F (36 7 °C)  HR:  [54-70] 70  Resp:  [18-20] 20  BP: (111-179)/(61-82) 120/68  No intake or output data in the 24 hours ending 06/08/21 1537    Physical Exam  Vitals signs reviewed  Constitutional:       General: He is awake  He is not in acute distress  Appearance: He is not ill-appearing, toxic-appearing or diaphoretic  HENT:      Head: Normocephalic and atraumatic  Nose: Nose normal    Eyes:      Extraocular Movements: Extraocular movements intact  Neck:      Musculoskeletal: Normal range of motion  Cardiovascular:      Rate and Rhythm: Normal rate  Pulmonary:      Effort: Pulmonary effort is normal  No tachypnea, bradypnea or respiratory distress  Breath sounds: Normal breath sounds  No decreased air movement  No wheezing or rhonchi  Skin:     General: Skin is warm and dry  Neurological:      Mental Status: He is alert and oriented to person, place, and time  Mental status is at baseline  Psychiatric:         Attention and Perception: Attention normal          Mood and Affect: Mood normal  Mood is not anxious  Speech: Speech normal          Behavior: Behavior normal  Behavior is cooperative  Lab Results:   I have personally reviewed pertinent labs  , CBC:   Lab Results   Component Value Date    WBC 5 20 06/08/2021    HGB 15 0 06/08/2021    HCT 44 6 06/08/2021    MCV 98 06/08/2021     06/08/2021    MCH 33 0 06/08/2021    MCHC 33 6 06/08/2021    RDW 12 4 06/08/2021    MPV 9 9 06/08/2021    NRBC 0 06/08/2021   , CMP:   Lab Results   Component Value Date    SODIUM 139 06/08/2021    K 4 3 06/08/2021     06/08/2021    CO2 27 06/08/2021    BUN 29 (H) 06/08/2021    CREATININE 1 23 06/08/2021    CALCIUM 8 8 06/08/2021    EGFR 58 06/08/2021   , BMP:  Lab Results Component Value Date    SODIUM 139 06/08/2021    K 4 3 06/08/2021     06/08/2021    CO2 27 06/08/2021    BUN 29 (H) 06/08/2021    CREATININE 1 23 06/08/2021    GLUC 185 (H) 06/08/2021    CALCIUM 8 8 06/08/2021    AGAP 7 06/08/2021    EGFR 58 06/08/2021   , PT/PTT:  Lab Results   Component Value Date    PTT 24 06/08/2021       Imaging Studies: I have personally reviewed pertinent reports  Counseling / Coordination of Care  Total floor / unit time spent today Level 3 = 55 minutes  Greater than 50% of total time was spent with the patient and / or family counseling and / or coordination of care  A description of the counseling / coordination of care:  Reviewed plan of care and medications with patient and treatment team     Please note that the APS provides consultative services regarding pain management only  With the exception of ketamine, peripheral nerve catheters, and epidural infusions (and except when indicated), final decisions regarding starting or changing doses of analgesic medications are at the discretion of the consulting service  Off hours consultation and/or medication management is generally not available      GABRIELLE Batista  Acute Pain Service

## 2021-06-08 NOTE — RESPIRATORY THERAPY NOTE
RT Protocol Note  Shelby Alonzo 70 y o  male MRN: [id number]  Unit/Bed#: ED 28 Encounter: [id number]    Assessment    Principal Problem:    Closed fracture of multiple ribs of right side  Active Problems:    Fall      Home Pulmonary Medications:  none       Past Medical History:   Diagnosis Date    GERD (gastroesophageal reflux disease)     Hypertension      Social History     Socioeconomic History    Marital status: Single     Spouse name: None    Number of children: None    Years of education: None    Highest education level: None   Occupational History    None   Social Needs    Financial resource strain: None    Food insecurity     Worry: None     Inability: None    Transportation needs     Medical: None     Non-medical: None   Tobacco Use    Smoking status: Never Smoker    Smokeless tobacco: Never Used   Substance and Sexual Activity    Alcohol use: Yes     Comment: 1-2 drinks a month    Drug use: Never    Sexual activity: Not Currently   Lifestyle    Physical activity     Days per week: None     Minutes per session: None    Stress: None   Relationships    Social connections     Talks on phone: None     Gets together: None     Attends Christianity service: None     Active member of club or organization: None     Attends meetings of clubs or organizations: None     Relationship status: None    Intimate partner violence     Fear of current or ex partner: None     Emotionally abused: None     Physically abused: None     Forced sexual activity: None   Other Topics Concern    None   Social History Narrative    Caffeine use- coffee on occasion       Subjective         Objective    Physical Exam:   Assessment Type: (P) Assess only  General Appearance: (P) Alert, Awake  Respiratory Pattern: (P) Normal, Spontaneous, Symmetrical  Chest Assessment: (P) Chest expansion symmetrical  Bilateral Breath Sounds: (P) Clear  Cough: (P) None  O2 Device: (P) 1L NC    Vitals:  Blood pressure 111/61, pulse 60, temperature 98 1 °F (36 7 °C), temperature source Oral, resp  rate 20, SpO2 94 %  Imaging and other studies: I have personally reviewed pertinent reports  O2 Device: (P) 1L NC     Plan       Airway Clearance Plan: (P) Incentive Spirometer     Resp Comments: (P) Pt assessed per ACP  No pulm history  Admittted for rib fractures due [place]  IS demonstrated proper;y and encouraged 10x an hour use while awake  IS of 1000  Pt instructed to use splinting for pain  Spo2 99% on 1L NC  Bilateral bs clear  Will continue to monitor with ACP

## 2021-06-09 ENCOUNTER — APPOINTMENT (INPATIENT)
Dept: RADIOLOGY | Facility: HOSPITAL | Age: 76
DRG: 185 | End: 2021-06-09
Payer: MEDICARE

## 2021-06-09 PROBLEM — R14.0 ABDOMINAL DISTENTION: Status: ACTIVE | Noted: 2021-06-09

## 2021-06-09 LAB
ANION GAP SERPL CALCULATED.3IONS-SCNC: 8 MMOL/L (ref 4–13)
BASOPHILS # BLD AUTO: 0.01 THOUSANDS/ΜL (ref 0–0.1)
BASOPHILS NFR BLD AUTO: 0 % (ref 0–1)
BUN SERPL-MCNC: 28 MG/DL (ref 5–25)
CALCIUM SERPL-MCNC: 9.1 MG/DL (ref 8.3–10.1)
CHLORIDE SERPL-SCNC: 105 MMOL/L (ref 100–108)
CO2 SERPL-SCNC: 24 MMOL/L (ref 21–32)
CREAT SERPL-MCNC: 1.21 MG/DL (ref 0.6–1.3)
EOSINOPHIL # BLD AUTO: 0.04 THOUSAND/ΜL (ref 0–0.61)
EOSINOPHIL NFR BLD AUTO: 1 % (ref 0–6)
ERYTHROCYTE [DISTWIDTH] IN BLOOD BY AUTOMATED COUNT: 12.7 % (ref 11.6–15.1)
GFR SERPL CREATININE-BSD FRML MDRD: 59 ML/MIN/1.73SQ M
GLUCOSE SERPL-MCNC: 135 MG/DL (ref 65–140)
HCT VFR BLD AUTO: 43.9 % (ref 36.5–49.3)
HGB BLD-MCNC: 14.9 G/DL (ref 12–17)
IMM GRANULOCYTES # BLD AUTO: 0.05 THOUSAND/UL (ref 0–0.2)
IMM GRANULOCYTES NFR BLD AUTO: 1 % (ref 0–2)
LYMPHOCYTES # BLD AUTO: 0.9 THOUSANDS/ΜL (ref 0.6–4.47)
LYMPHOCYTES NFR BLD AUTO: 10 % (ref 14–44)
MCH RBC QN AUTO: 33.6 PG (ref 26.8–34.3)
MCHC RBC AUTO-ENTMCNC: 33.9 G/DL (ref 31.4–37.4)
MCV RBC AUTO: 99 FL (ref 82–98)
MONOCYTES # BLD AUTO: 0.89 THOUSAND/ΜL (ref 0.17–1.22)
MONOCYTES NFR BLD AUTO: 10 % (ref 4–12)
NEUTROPHILS # BLD AUTO: 6.96 THOUSANDS/ΜL (ref 1.85–7.62)
NEUTS SEG NFR BLD AUTO: 78 % (ref 43–75)
NRBC BLD AUTO-RTO: 0 /100 WBCS
PLATELET # BLD AUTO: 198 THOUSANDS/UL (ref 149–390)
PMV BLD AUTO: 9.8 FL (ref 8.9–12.7)
POTASSIUM SERPL-SCNC: 4.2 MMOL/L (ref 3.5–5.3)
RBC # BLD AUTO: 4.43 MILLION/UL (ref 3.88–5.62)
SODIUM SERPL-SCNC: 137 MMOL/L (ref 136–145)
WBC # BLD AUTO: 8.85 THOUSAND/UL (ref 4.31–10.16)

## 2021-06-09 PROCEDURE — 99232 SBSQ HOSP IP/OBS MODERATE 35: CPT | Performed by: EMERGENCY MEDICINE

## 2021-06-09 PROCEDURE — 71046 X-RAY EXAM CHEST 2 VIEWS: CPT

## 2021-06-09 PROCEDURE — 80048 BASIC METABOLIC PNL TOTAL CA: CPT | Performed by: PHYSICIAN ASSISTANT

## 2021-06-09 PROCEDURE — 74018 RADEX ABDOMEN 1 VIEW: CPT

## 2021-06-09 PROCEDURE — 97166 OT EVAL MOD COMPLEX 45 MIN: CPT

## 2021-06-09 PROCEDURE — 97162 PT EVAL MOD COMPLEX 30 MIN: CPT

## 2021-06-09 PROCEDURE — 85025 COMPLETE CBC W/AUTO DIFF WBC: CPT | Performed by: PHYSICIAN ASSISTANT

## 2021-06-09 RX ORDER — LIDOCAINE 50 MG/G
1 PATCH TOPICAL DAILY
Status: COMPLETED | OUTPATIENT
Start: 2021-06-09 | End: 2021-06-10

## 2021-06-09 RX ORDER — SIMETHICONE 80 MG
80 TABLET,CHEWABLE ORAL EVERY 6 HOURS PRN
Status: DISCONTINUED | OUTPATIENT
Start: 2021-06-09 | End: 2021-06-10 | Stop reason: HOSPADM

## 2021-06-09 RX ADMIN — GABAPENTIN 100 MG: 100 CAPSULE ORAL at 21:39

## 2021-06-09 RX ADMIN — Medication 2000 UNITS: at 08:21

## 2021-06-09 RX ADMIN — DOCUSATE SODIUM AND SENNOSIDES 2 TABLET: 8.6; 5 TABLET ORAL at 08:21

## 2021-06-09 RX ADMIN — ACETAMINOPHEN 975 MG: 325 TABLET, FILM COATED ORAL at 14:37

## 2021-06-09 RX ADMIN — SIMETHICONE 80 MG: 80 TABLET, CHEWABLE ORAL at 00:17

## 2021-06-09 RX ADMIN — ACETAMINOPHEN 975 MG: 325 TABLET, FILM COATED ORAL at 05:52

## 2021-06-09 RX ADMIN — METHOCARBAMOL 500 MG: 500 TABLET, FILM COATED ORAL at 05:52

## 2021-06-09 RX ADMIN — ACETAMINOPHEN 975 MG: 325 TABLET, FILM COATED ORAL at 21:39

## 2021-06-09 RX ADMIN — PANTOPRAZOLE SODIUM 40 MG: 40 TABLET, DELAYED RELEASE ORAL at 05:52

## 2021-06-09 RX ADMIN — METHOCARBAMOL 500 MG: 500 TABLET, FILM COATED ORAL at 21:39

## 2021-06-09 RX ADMIN — OXYCODONE HYDROCHLORIDE 5 MG: 5 TABLET ORAL at 11:41

## 2021-06-09 RX ADMIN — OXYCODONE HYDROCHLORIDE 5 MG: 5 TABLET ORAL at 19:31

## 2021-06-09 RX ADMIN — ENOXAPARIN SODIUM 30 MG: 30 INJECTION, SOLUTION INTRAVENOUS; SUBCUTANEOUS at 11:41

## 2021-06-09 RX ADMIN — LIDOCAINE 1 PATCH: 50 PATCH TOPICAL at 14:37

## 2021-06-09 RX ADMIN — AMLODIPINE BESYLATE 5 MG: 5 TABLET ORAL at 08:21

## 2021-06-09 RX ADMIN — METHOCARBAMOL 500 MG: 500 TABLET, FILM COATED ORAL at 14:37

## 2021-06-09 RX ADMIN — LISINOPRIL 20 MG: 20 TABLET ORAL at 17:20

## 2021-06-09 RX ADMIN — ENOXAPARIN SODIUM 30 MG: 30 INJECTION, SOLUTION INTRAVENOUS; SUBCUTANEOUS at 21:39

## 2021-06-09 RX ADMIN — POLYETHYLENE GLYCOL 3350 17 G: 17 POWDER, FOR SOLUTION ORAL at 00:18

## 2021-06-09 NOTE — PLAN OF CARE
Problem: PAIN - ADULT  Goal: Verbalizes/displays adequate comfort level or baseline comfort level  Description: Interventions:  - Encourage patient to monitor pain and request assistance  - Assess pain using appropriate pain scale  - Administer analgesics based on type and severity of pain and evaluate response  - Implement non-pharmacological measures as appropriate and evaluate response  - Consider cultural and social influences on pain and pain management  - Notify physician/advanced practitioner if interventions unsuccessful or patient reports new pain  Outcome: Progressing     Problem: INFECTION - ADULT  Goal: Absence or prevention of progression during hospitalization  Description: INTERVENTIONS:  - Assess and monitor for signs and symptoms of infection  - Monitor lab/diagnostic results  - Monitor all insertion sites, i e  indwelling lines, tubes, and drains  - Monitor endotracheal if appropriate and nasal secretions for changes in amount and color  - [place] appropriate cooling/warming therapies per order  - Administer medications as ordered  - Instruct and encourage patient and family to use good hand hygiene technique  - Identify and instruct in appropriate isolation precautions for identified infection/condition  Outcome: Progressing  Goal: Absence of fever/infection during neutropenic period  Description: INTERVENTIONS:  - Monitor WBC    Outcome: Progressing     Problem: SAFETY ADULT  Goal: Patient will remain free of falls  Description: INTERVENTIONS:  - Assess patient frequently for physical needs  -  Identify cognitive and physical deficits and behaviors that affect risk of falls    -  [place] fall precautions as indicated by assessment   - Educate patient/family on patient safety including physical limitations  - Instruct patient to call for assistance with activity based on assessment  - Modify environment to reduce risk of injury  - Consider OT/PT consult to assist with strengthening/mobility  Outcome: Progressing  Goal: Maintain or return to baseline ADL function  Description: INTERVENTIONS:  -  Assess patient's ability to carry out ADLs; assess patient's baseline for ADL function and identify physical deficits which impact ability to perform ADLs (bathing, care of mouth/teeth, toileting, grooming, dressing, etc )  - Assess/evaluate cause of self-care deficits   - Assess range of motion  - Assess patient's mobility; develop plan if impaired  - Assess patient's need for assistive devices and provide as appropriate  - Encourage maximum independence but intervene and supervise when necessary  - Involve family in performance of ADLs  - Assess for home care needs following discharge   - Consider OT consult to assist with ADL evaluation and planning for discharge  - Provide patient education as appropriate  Outcome: Progressing  Goal: Maintain or return mobility status to optimal level  Description: INTERVENTIONS:  - Assess patient's baseline mobility status (ambulation, transfers, stairs, etc )    - Identify cognitive and physical deficits and behaviors that affect mobility  - Identify mobility aids required to assist with transfers and/or ambulation (gait belt, sit-to-stand, lift, walker, cane, etc )  - [place] fall precautions as indicated by assessment  - Record patient progress and toleration of activity level on Mobility SBAR; progress patient to next Phase/Stage  - Instruct patient to call for assistance with activity based on assessment  - Consider rehabilitation consult to assist with strengthening/weightbearing, etc   Outcome: Progressing     Problem: DISCHARGE PLANNING  Goal: Discharge to home or other facility with appropriate resources  Description: INTERVENTIONS:  - Identify barriers to discharge w/patient and caregiver  - Arrange for needed discharge resources and transportation as appropriate  - Identify discharge learning needs (meds, wound care, etc )  - Arrange for interpretive services to assist at discharge as needed  - Refer to Case Management Department for coordinating discharge planning if the patient needs post-hospital services based on physician/advanced practitioner order or complex needs related to functional status, cognitive ability, or social support system  Outcome: Progressing     Problem: Knowledge Deficit  Goal: Patient/family/caregiver demonstrates understanding of disease process, treatment plan, medications, and discharge instructions  Description: Complete learning assessment and assess knowledge base  Interventions:  - Provide teaching at level of understanding  - Provide teaching via preferred learning methods  Outcome: Progressing     Problem: Nutrition/Hydration-ADULT  Goal: Nutrient/Hydration intake appropriate for improving, restoring or maintaining nutritional needs  Description: Monitor and assess patient's nutrition/hydration status for malnutrition  Collaborate with interdisciplinary team and initiate plan and interventions as ordered  Monitor patient's weight and dietary intake as ordered or per policy  Utilize nutrition screening tool and intervene as necessary  Determine patient's food preferences and provide high-protein, high-caloric foods as appropriate       INTERVENTIONS:  - Monitor oral intake, urinary output, labs, and treatment plans  - Assess nutrition and hydration status and recommend course of action  - Evaluate amount of meals eaten  - Assist patient with eating if necessary   - Allow adequate time for meals  - Recommend/ encourage appropriate diets, oral nutritional supplements, and vitamin/mineral supplements  - Order, calculate, and assess calorie counts as needed  - Recommend, monitor, and adjust tube feedings and TPN/PPN based on assessed needs  - Assess need for intravenous fluids  - Provide specific nutrition/hydration education as appropriate  - Include patient/family/caregiver in decisions related to nutrition  Outcome: Progressing

## 2021-06-09 NOTE — PLAN OF CARE
Problem: PAIN - ADULT  Goal: Verbalizes/displays adequate comfort level or baseline comfort level  Description: Interventions:  - Encourage patient to monitor pain and request assistance  - Assess pain using appropriate pain scale  - Administer analgesics based on type and severity of pain and evaluate response  - Implement non-pharmacological measures as appropriate and evaluate response  - Consider cultural and social influences on pain and pain management  - Notify physician/advanced practitioner if interventions unsuccessful or patient reports new pain  Outcome: Progressing     Problem: INFECTION - ADULT  Goal: Absence or prevention of progression during hospitalization  Description: INTERVENTIONS:  - Assess and monitor for signs and symptoms of infection  - Monitor lab/diagnostic results  - Monitor all insertion sites, i e  indwelling lines, tubes, and drains  - Monitor endotracheal if appropriate and nasal secretions for changes in amount and color  - [place] appropriate cooling/warming therapies per order  - Administer medications as ordered  - Instruct and encourage patient and family to use good hand hygiene technique  - Identify and instruct in appropriate isolation precautions for identified infection/condition  Outcome: Progressing  Goal: Absence of fever/infection during neutropenic period  Description: INTERVENTIONS:  - Monitor WBC    Outcome: Progressing     Problem: SAFETY ADULT  Goal: Patient will remain free of falls  Description: INTERVENTIONS:  - Assess patient frequently for physical needs  -  Identify cognitive and physical deficits and behaviors that affect risk of falls    -  [place] fall precautions as indicated by assessment   - Educate patient/family on patient safety including physical limitations  - Instruct patient to call for assistance with activity based on assessment  - Modify environment to reduce risk of injury  - Consider OT/PT consult to assist with strengthening/mobility  Outcome: Progressing  Goal: Maintain or return to baseline ADL function  Description: INTERVENTIONS:  -  Assess patient's ability to carry out ADLs; assess patient's baseline for ADL function and identify physical deficits which impact ability to perform ADLs (bathing, care of mouth/teeth, toileting, grooming, dressing, etc )  - Assess/evaluate cause of self-care deficits   - Assess range of motion  - Assess patient's mobility; develop plan if impaired  - Assess patient's need for assistive devices and provide as appropriate  - Encourage maximum independence but intervene and supervise when necessary  - Involve family in performance of ADLs  - Assess for home care needs following discharge   - Consider OT consult to assist with ADL evaluation and planning for discharge  - Provide patient education as appropriate  Outcome: Progressing  Goal: Maintain or return mobility status to optimal level  Description: INTERVENTIONS:  - Assess patient's baseline mobility status (ambulation, transfers, stairs, etc )    - Identify cognitive and physical deficits and behaviors that affect mobility  - Identify mobility aids required to assist with transfers and/or ambulation (gait belt, sit-to-stand, lift, walker, cane, etc )  - [place] fall precautions as indicated by assessment  - Record patient progress and toleration of activity level on Mobility SBAR; progress patient to next Phase/Stage  - Instruct patient to call for assistance with activity based on assessment  - Consider rehabilitation consult to assist with strengthening/weightbearing, etc   Outcome: Progressing     Problem: DISCHARGE PLANNING  Goal: Discharge to home or other facility with appropriate resources  Description: INTERVENTIONS:  - Identify barriers to discharge w/patient and caregiver  - Arrange for needed discharge resources and transportation as appropriate  - Identify discharge learning needs (meds, wound care, etc )  - Arrange for interpretive services to assist at discharge as needed  - Refer to Case Management Department for coordinating discharge planning if the patient needs post-hospital services based on physician/advanced practitioner order or complex needs related to functional status, cognitive ability, or social support system  Outcome: Progressing     Problem: Knowledge Deficit  Goal: Patient/family/caregiver demonstrates understanding of disease process, treatment plan, medications, and discharge instructions  Description: Complete learning assessment and assess knowledge base  Interventions:  - Provide teaching at level of understanding  - Provide teaching via preferred learning methods  Outcome: Progressing     Problem: Nutrition/Hydration-ADULT  Goal: Nutrient/Hydration intake appropriate for improving, restoring or maintaining nutritional needs  Description: Monitor and assess patient's nutrition/hydration status for malnutrition  Collaborate with interdisciplinary team and initiate plan and interventions as ordered  Monitor patient's weight and dietary intake as ordered or per policy  Utilize nutrition screening tool and intervene as necessary  Determine patient's food preferences and provide high-protein, high-caloric foods as appropriate       INTERVENTIONS:  - Monitor oral intake, urinary output, labs, and treatment plans  - Assess nutrition and hydration status and recommend course of action  - Evaluate amount of meals eaten  - Assist patient with eating if necessary   - Allow adequate time for meals  - Recommend/ encourage appropriate diets, oral nutritional supplements, and vitamin/mineral supplements  - Order, calculate, and assess calorie counts as needed  - Recommend, monitor, and adjust tube feedings and TPN/PPN based on assessed needs  - Assess need for intravenous fluids  - Provide specific nutrition/hydration education as appropriate  - Include patient/family/caregiver in decisions related to nutrition  Outcome: Progressing

## 2021-06-09 NOTE — PROGRESS NOTES
[facility]  Progress Note - Aureliano Araujo 1945, 70 y o  male MRN: [id number]  Unit/Bed#: [facility] 627-01 Encounter: [id number]  Primary Care Provider: Alexandra Felder MD   Date and time admitted to hospital: 6/8/2021  1:19 PM    Fall  Assessment & Plan  - Status post mechanical fall approximately 4 feet onto right side with the below noted injuries  - Fall precautions   - PT and OT evaluation and treatment as indicated  - Case Management consultation for disposition planning  * Closed fracture of multiple ribs of right side  Assessment & Plan  - Multiple right-sided rib fractures (nondisplaced 7th & mildly displaced 9-10), present on admission   - Continue rib fracture protocol   - Continue to encourage incentive spirometer use and adequate pulmonary hygiene  Currently pulling 1,250 mL on I S   - Continue multimodal analgesic regimen  Appreciate APS evaluation and recommendations  Lovenox resumed on 06/09/2021 as patient did not require epidural catheter   - Patient on room air with no supplemental oxygen needed at this time  May provide supplemental oxygen via nasal cannula as needed to maintain saturations greater than or equal to 94%  - Plan for repeat chest x-ray on 6/9/2021    - PT and OT evaluation and treatment as indicated  Acute pain due to trauma  Assessment & Plan  - Acute pain to right chest wall injury  - Initiate multimodal analgesic regimen  - Acute Pain Service consulted for evaluation recommendations  - Lovenox resumed on 06/09/2021 as patient did not require epidural catheter  Abdominal distention  Assessment & Plan  - Abdominal distension developed overnight, but improved with significant flatus episodes  Patient notes mild continued distension this morning but feels less bloated and denies any significant abdominal pain  - Continue diet as tolerated    - Monitor abdominal exam and for signs/symptoms of ileus or constipation in setting of opioid use  - Continue bowel regimen  TERTIARY TRAUMA SURVEY NOTE    Prophylaxis: Sequential compression device (Venodyne)  and Enoxaparin (Lovenox)    Disposition:  Anticipate discharge home in the next 24 hours if pain adequately controlled repeat chest x-ray does not demonstrate any findings requiring further observation or intervention  Code status:  Level 1 - Full Code    Consultants:  Acute Pain Service  Is the patient 72 years or older?: YES:    1  Before the illness or injury that brought you to the Emergency, did you need someone to help you on a regular basis? 0=No   2  Since the illness or injury that brought you to the Emergency, have you needed more help than usual to take care of yourself? 0=No   3  Have you been hospitalized for one or more nights during the past 6 months (excluding a stay in the Emergency Department)? 0=No   4  In general, do you see well? 0=Yes   5  In general, do you have serious problems with your memory? 0=No   6  Do you take more than three different medications everyday? 1=Yes   TOTAL   1     Did you order a geriatric consult if the score was 2 or greater?: n/a          SUBJECTIVE:     Transfer from:  [facility]  Outside Films Received: yes  Tertiary Exam Due on: 6/9/2021     Mechanism of Injury: Fall    Details related to Injury: +LOC:  no    Chief Complaint:  I feel okay "    HPI/Last 24 hour events:  Patient is feeling okay this morning  Overnight he did have some abdominal bloating and gas pain, but this improved after flatus  This morning he remains bloated, but definitely feels better and softer and denies any abdominal pain  He was able to tolerate breakfast without nausea or vomiting  He notes continued pain in his right chest and back where he struck his side during his fall, but notes his pain is currently controlled and denied any shortness of breath or difficulty breathing    He has no other new complaints this morning  Active medications:           Current Facility-Administered Medications:     acetaminophen (TYLENOL) tablet 975 mg, 975 mg, Oral, Q8H [address], 975 mg at 06/09/21 0552    amLODIPine (NORVASC) tablet 5 mg, 5 mg, Oral, Daily, 5 mg at 06/09/21 1712    cholecalciferol (VITAMIN D3) tablet 2,000 Units, 2,000 Units, Oral, Daily, 2,000 Units at 06/09/21 0821    enoxaparin (LOVENOX) subcutaneous injection 30 mg, 30 mg, Subcutaneous, Q12H [address]    gabapentin (NEURONTIN) capsule 100 mg, 100 mg, Oral, HS, 100 mg at 06/08/21 2107    HYDROmorphone HCl (DILAUDID) injection 0 2 mg, 0 2 mg, Intravenous, Q1H PRN    lisinopril (ZESTRIL) tablet 20 mg, 20 mg, Oral, Daily, 20 mg at 06/08/21 1820    methocarbamol (ROBAXIN) tablet 500 mg, 500 mg, Oral, Q8H [address], 500 mg at 06/09/21 0552    naloxone (NARCAN) 0 04 mg/mL syringe 0 04 mg, 0 04 mg, Intravenous, Q1MIN PRN    ondansetron (ZOFRAN) injection 4 mg, 4 mg, Intravenous, Q4H PRN    oxyCODONE (ROXICODONE) IR tablet 2 5 mg, 2 5 mg, Oral, Q4H PRN    oxyCODONE (ROXICODONE) IR tablet 5 mg, 5 mg, Oral, Q4H PRN, 5 mg at 06/09/21 1141    pantoprazole (PROTONIX) EC tablet 40 mg, 40 mg, Oral, Early Morning, 40 mg at 06/09/21 0552    polyethylene glycol (MIRALAX) packet 17 g, 17 g, Oral, Daily PRN, 17 g at 06/09/21 0018    senna-docusate sodium (SENOKOT S) 8 6-50 mg per tablet 2 tablet, 2 tablet, Oral, Daily, 2 tablet at 06/09/21 7893    simethicone (MYLICON) chewable tablet 80 mg, 80 mg, Oral, Q6H PRN, 80 mg at 06/09/21 0017      OBJECTIVE:     Vitals:   Vitals:    06/09/21 1141   BP: 120/58   Pulse:    Resp: 16   Temp: 97 9 °F (36 6 °C)   SpO2: 95%       Physical Exam:   GENERAL APPEARANCE: Patient in no acute distress  HEENT: NCAT; PERRL, EOMs intact; Mucous membranes moist  NECK / BACK:  No midline cervical, thoracic or lumbar spine tenderness, step-offs or deformities  No paraspinal muscular tenderness in the neck or back    There was some right posterior chest wall tenderness and right lateral back/flank healing abrasions with mild tenderness  CV: Regular rate and rhythm; no murmur/gallops/rubs appreciated  CHEST / LUNGS: Clear to auscultation; no wheezes/rales/rhonci  Mild to moderate right posterior chest wall tenderness without crepitus or deformity  ABD: NABS; soft; mildly distended and tympanitic, but non-tender  :  Voiding spontaneously  EXT: +2 pulses bilaterally upper & lower extremities; no edema  Normal range of motion in all 4 extremities without pain or tenderness today  NEURO: GCS 15; no focal neurologic deficits; neurovascularly intact  SKIN: Warm, dry and well perfused; no rash; no jaundice  Healing right flank/lateral back abrasions  I/O:   I/O       06/07 0701 - 06/08 0700 06/08 0701 - 06/09 0700 06/09 0701 - 06/10 0700    P  O   480     Total Intake(mL/kg)  480 (6 7)     Urine (mL/kg/hr)  400     Total Output  400     Net  +80            Unmeasured Urine Occurrence  4 x           Invasive Devices: Invasive Devices     Peripheral Intravenous Line            Peripheral IV 06/08/21 Right Antecubital 1 day                  Imaging:   Xr Trauma Chest Portable    Result Date: 6/8/2021  Impression: No active pulmonary disease on examination which is somewhat limited secondary to low lung volumes  (Please however refer to subsequently performed CT chest report which demonstrates right-sided rib fractures ) Workstation performed: [id number]     Trauma - Ct Chest Abdomen Pelvis W Contrast    Result Date: 6/8/2021  Impression: Slightly displaced right posterior 9th and 10th rib fractures with nondisplaced 7th rib fracture  Contusion changes in the left lung peripherally  The study was marked in [facility] for immediate notification   Workstation performed: [id number]       Labs:   CBC:   Lab Results   Component Value Date    WBC 8 85 06/09/2021    HGB 14 9 06/09/2021    HCT 43 9 06/09/2021    MCV 99 (H) 06/09/2021     06/09/2021    MCH 33 6 06/09/2021    MCHC 33 9 06/09/2021    RDW 12 7 06/09/2021    MPV 9 8 06/09/2021    NRBC 0 06/09/2021     CMP:   Lab Results   Component Value Date     06/09/2021    CO2 24 06/09/2021    BUN 28 (H) 06/09/2021    CREATININE 1 21 06/09/2021    CALCIUM 9 1 06/09/2021    EGFR 59 06/09/2021     Coagulation: No results found for: PT, INR, APTT      Danielle Nicholas PA-C  6/9/2021 11:58 AM

## 2021-06-09 NOTE — ASSESSMENT & PLAN NOTE
- Multiple right-sided rib fractures (nondisplaced 7th & mildly displaced 9-10), present on admission   - Continue rib fracture protocol   - Continue to encourage incentive spirometer use and adequate pulmonary hygiene  Currently pulling 1,250 mL on I S   - Continue multimodal analgesic regimen  Appreciate APS evaluation and recommendations  Lovenox resumed on 06/09/2021 as patient did not require epidural catheter   - Patient on room air with no supplemental oxygen needed at this time  May provide supplemental oxygen via nasal cannula as needed to maintain saturations greater than or equal to 94%  - Plan for repeat chest x-ray on 6/9/2021    - PT and OT evaluation and treatment as indicated

## 2021-06-09 NOTE — OCCUPATIONAL THERAPY NOTE
Occupational Therapy Evaluation     Patient Name: Sienna Bunch  FUYNK'D Date: 6/9/2021  Problem List  Principal Problem:    Closed fracture of multiple ribs of right side  Active Problems:    Fall    Acute pain due to trauma    Abdominal distention    Past Medical History  Past Medical History:   Diagnosis Date    GERD (gastroesophageal reflux disease)     Hypertension      Past Surgical History  Past Surgical History:   Procedure Laterality Date    KNEE ARTHROSCOPY           06/09/21 1052   OT Last Visit   OT Visit Date 06/09/21   Note Type   Note type Evaluation   Restrictions/Precautions   Weight Bearing Precautions Per Order No   Other Precautions Pain   Pain Assessment   Pain Assessment Tool 0-10   Pain Score 3   Pain Location/Orientation Orientation: Right;Location: Rib Cage   Patient's Stated Pain Goal No pain   Hospital Pain Intervention(s) Repositioned; Ambulation/increased activity; Emotional support   Home Living   Type of [address] [place] [address] Two level;1/2 bath on main level;Stairs to enter with rails  (1 ADALBERTO )   Bathroom Shower/Tub Tub/shower unit   Bathroom Toilet Standard   Home Equipment Walker;Cane   Additional Comments NO USE OF DME AT BASELINE    Prior Function   Level of [place] Independent with ADLs and functional mobility   Lives With [employer] Help From Family; Neighbor   ADL Assistance Independent   IADLs Independent   Falls in the last 6 months 1 to 4  (1-REASON FOR ADMISSION )   Vocational Retired   Lifestyle   Autonomy  [facility] ADLS/IADLS/DRIVING- CAREGIVER  YO MOTHER, HOWEVER REPORTS SHE IS CURRENTLY GOING ON HOSPICE CARE    Reciprocal Relationships LIVES WITH 100 YO MOTHER   PT REPORTS ADDITIONAL SUPPORTIVE FAMILY AND NEIGHBORS WHO CAN ASSIST BOTH PT AND HIS MOTHER AS NEEDED    Service to Others RETIRED   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    Psychosocial   Psychosocial (WDL) WDL   ADL   Eating Assistance 7  Independent Grooming Assistance 7  Independent   UB Bathing Assistance 5  Supervision/Setup   LB [address] 5  Supervision/Setup   [address]  [address]  5  Supervision/Setup   Functional Assistance 5  Supervision/Setup   Bed Mobility   Additional Comments PT SITTING OOB UPON ARRIVAL  PT LEFT WITH PHYSICAL THERAPIST TO ASSES FURTHER MOBILITY    Transfers   Sit to Stand 5  Supervision   Stand to Sit 5  Supervision   Functional Mobility   Functional Mobility 5  Supervision   Balance   Static Sitting Fair +   Static Standing Fair   Ambulatory Fair -   Activity Tolerance   Activity Tolerance Patient tolerated treatment well;Patient limited by pain   Medical Staff Made Aware PT SEEN FOR PARTIAL CO-SESSION WITH SKILLED PHYSICAL THERAPIST 2' CLINICALLY UNSTABLE PRESENTATION, TRAUMATIC INJURIES, NEW PRECAUTIONS/LIMITATIONS, LIMITED ACTIVITY TOLERANCE AND PRESENT IMPAIRMENTS WHICH ARE A REGRESSION FROM THE PT'S BASELINE AND IMPACTING OVERALL OCCUPATIONAL PERFORMANCE  - CM    Nurse Made Aware APPROPRIATE TO SEE    RUE Assessment   RUE Assessment WFL   LUE Assessment   LUE Assessment WFL   Hand Function   Gross Motor Coordination Functional   Fine Motor Coordination Functional   Sensation   Light Touch No apparent deficits   Cognition   Overall Cognitive Status WFL   Arousal/Participation Alert; Cooperative   Attention Within functional limits   Orientation Level Oriented X4   Memory Within functional limits   Following Commands Follows all commands and directions without difficulty   Comments PT IS PLEASANT AND COOPERATIVE    Assessment   Assessment 71 YO Male SEEN FOR INITIAL OCCUPATIONAL THERAPY EVALUATION FOLLOWING TXF FROM [facility] S/P FALL OFF A HAY BALE RESULTING IN MULTIPLE R SIDED RIB FX  PROBLEMS LIST INCLUDES HTN AND GERD   PT IS FROM HOME WITH 100 YO MOTHER WHERE HE REPORTS BEING INDEPENDENT WITH ADLS/IADLS/DRIVING AND IS THE PRIMARY CAREGIVER OF HIS MOTHER  PT CURRENTLY REQUIRES OVERALL SUPERVISION WITH ADLS, TRANSFERS AND FUNCTIONAL MOBILITY WITHOUT USE OF AD  PT IS EXPERIENCING EXPECTED LIMITATIONS 2' PAIN, FATIGUE, IMPAIRED BALANCE and OVERALL LIMITED ACTIVITY TOLERANCE  PT EDUCATED ON DEEP BREATHING TECHNIQUES T/O ACTIVITY, SLOWING OF PACE, ENERGY CONSERVATION TECHNIQUES FOR CARRY OVER UPON D/C, INCREASED FAMILY SUPPORT and CONTINUE PARTICIPATION IN SELF-CARE/MOBILITY WITH STAFF [address]   The patient's raw score on the AM-PAC Daily Activity inpatient short form is 21, standardized score is 44 27, greater than 39 4  Patients at this level are likely to benefit from discharge to home  Please refer to the recommendation of the Occupational Therapist for safe discharge planning  PT REPORTS SUPPORTIVE FAMILY AND NEIGHBORS WHO ARE ABLE TO ASSIST AS NEEDED  FROM AN OCCUPATIONAL THERAPY PERSPECTIVE, PT CAN RETURN HOME WITH INCREASED FAMILY SUPPORT WHEN MEDICALLY CLEARED  ALL QUESTIONS/CONCERNS ADDRESSED  NO ADDITIONAL ACUTE CARE OT NEEDS  D/C OT      Goals   Patient Goals TO RETURN HOME    Recommendation   OT Discharge Recommendation No rehabilitation needs  (INCREASED SUPPORT )   OT - OK to Discharge Yes   AM-PAC Daily Activity Inpatient   Lower Body Dressing 3   Bathing 3   Toileting 3   Upper Body Dressing 4   Grooming 4   Eating 4   Daily Activity Raw Score 21   Daily Activity Standardized Score (Calc for Raw Score >=11) 44 27   AM-PAC Applied Cognition Inpatient   Following a Speech/Presentation 4   Understanding Ordinary Conversation 4   Taking Medications 4   Remembering Where Things Are Placed or Put Away 4   Remembering List of 4-5 Errands 4   Taking Care of Complicated Tasks 4   Applied Cognition Raw Score 24   Applied Cognition Standardized Score 62 21   Modified [place] Scale   Modified [place] Scale 2       Documentation completed by CHAD Jasso, OTR/L

## 2021-06-09 NOTE — PROGRESS NOTES
Progress Note - Acute Pain Service    Darlene Mcintosh 70 y o  male MRN: [id number]  Unit/Bed#: [facility] 627-01 Encounter: [id number]      Assessment:   Principal Problem:    Closed fracture of multiple ribs of right side  Active Problems:    Fall    Acute pain due to trauma    Darlene Mcintosh is a 70 y o  male with past medical history significant for hypertension, GERD who presents after fall found to have right posterior 9th and 10th rib fractures with nondisplaced 7th rib fracture  Plan:   - continue acetaminophen 975 mg p o  Q 8 hours scheduled  - continue oxycodone 2 5 mg p o  Q 4 hours p r n  For moderate pain  - continue oxycodone 5 mg p o  Q 4 hours p r n  For severe pain  - discontinue IV hydromorphone  - continue gabapentin 100 mg p o  Hs  - continue methocarbamol 500 mg p o  Q 8 hours scheduled - would not discharge with muscle relaxant  - start lidocaine patch 12 hours on/12 hours off to right chest wall  - continue lidocaine patch daily 12 hours on/12 hours off  - bowel regimen with senna-docusate scheduled, MiraLax daily as needed  - at this point, would risks of thoracic epidural outweigh benefits - can restart DVT prophylaxis with Lovenox    APS will sign off at this time  Thank you for the consult  All opioids and other analgesics to be written at discretion of primary team  Please call  / [phone] or [facility] - [facility] (/ between [phone] and on weekends) with questions or concerns    Pain History  Current pain location(s):  Right chest wall  Pain Scale:   0-5  Quality:  Tight, sore  24 hour history:  Patient examined at bedside  Patient states his pain is currently well controlled on current regimen  He refers some abdominal cramping bloating yesterday and states that he did have a small bowel movement this morning that was loose  He has not required any analgesics as this morning  All other review of systems negative this time      Opioid requirement previous 24 hours: Oxycodone 5 mg  Hydromorphone 1 mg IV    Meds/Allergies   all current active meds have been reviewed and current meds:   Current Facility-Administered Medications   Medication Dose Route Frequency    acetaminophen (TYLENOL) tablet 975 mg  975 mg Oral Q8H [address]    amLODIPine (NORVASC) tablet 5 mg  5 mg Oral Daily    cholecalciferol (VITAMIN D3) tablet 2,000 Units  2,000 Units Oral Daily    gabapentin (NEURONTIN) capsule 100 mg  100 mg Oral HS    HYDROmorphone HCl (DILAUDID) injection 0 2 mg  0 2 mg Intravenous Q1H PRN    lisinopril (ZESTRIL) tablet 20 mg  20 mg Oral Daily    methocarbamol (ROBAXIN) tablet 500 mg  500 mg Oral Q8H [address]    naloxone (NARCAN) 0 04 mg/mL syringe 0 04 mg  0 04 mg Intravenous Q1MIN PRN    ondansetron (ZOFRAN) injection 4 mg  4 mg Intravenous Q4H PRN    oxyCODONE (ROXICODONE) IR tablet 2 5 mg  2 5 mg Oral Q4H PRN    oxyCODONE (ROXICODONE) IR tablet 5 mg  5 mg Oral Q4H PRN    pantoprazole (PROTONIX) EC tablet 40 mg  40 mg Oral Early Morning    polyethylene glycol (MIRALAX) packet 17 g  17 g Oral Daily PRN    senna-docusate sodium (SENOKOT S) 8 6-50 mg per tablet 2 tablet  2 tablet Oral Daily    simethicone (MYLICON) chewable tablet 80 mg  80 mg Oral Q6H PRN       Allergies   Allergen Reactions    Sulfa Antibiotics      As a child       Objective     Temp:  [97 5 °F (36 4 °C)-98 1 °F (36 7 °C)] 98 °F (36 7 °C)  HR:  [] 78  Resp:  [16-20] 18  BP: (111-179)/() 129/66    Physical Exam  Vitals signs and nursing note reviewed  Constitutional:       Appearance: Normal appearance  He is not ill-appearing or toxic-appearing  HENT:      Head: Normocephalic and atraumatic  Eyes:      General: No scleral icterus  Conjunctiva/sclera: Conjunctivae normal    Cardiovascular:      Rate and Rhythm: Normal rate  Pulmonary:      Effort: Pulmonary effort is normal  No respiratory distress  Comments: IS 1250  RA  Chest:      Chest wall: Tenderness present     Skin: General: Skin is warm and dry  Neurological:      Mental Status: He is alert  Comments: Awake, alert and oriented to person place time situation   Psychiatric:         Thought Content: Thought content normal          Lab Results:   Results from last 7 days   Lab Units 06/09/21  0542   WBC Thousand/uL 8 85   HEMOGLOBIN g/dL 14 9   HEMATOCRIT % 43 9   PLATELETS Thousands/uL 198      Results from last 7 days   Lab Units 06/09/21  0542   POTASSIUM mmol/L 4 2   CHLORIDE mmol/L 105   CO2 mmol/L 24   BUN mg/dL 28*   CREATININE mg/dL 1 21   CALCIUM mg/dL 9 1       Imaging Studies: I have personally reviewed pertinent reports  EKG, Pathology, and Other Studies: I have personally reviewed pertinent reports  Please note that the APS provides consultative services regarding pain management only  With the exception of ketamine and epidural infusions and except when indicated, final decisions regarding starting or changing doses of analgesic medications are at the discretion of the consulting service  Off hours consultation and/or medication management is generally not available      Arielle Valenzuela MD  Acute Pain Service

## 2021-06-09 NOTE — ASSESSMENT & PLAN NOTE
- Abdominal distension developed overnight, but improved with significant flatus episodes  Patient notes mild continued distension this morning but feels less bloated and denies any significant abdominal pain  - Continue diet as tolerated  - Monitor abdominal exam and for signs/symptoms of ileus or constipation in setting of opioid use  - Continue bowel regimen

## 2021-06-09 NOTE — ASSESSMENT & PLAN NOTE
- Acute pain to right chest wall injury  - Initiate multimodal analgesic regimen  - Acute Pain Service consulted for evaluation recommendations  - Lovenox resumed on 06/09/2021 as patient did not require epidural catheter

## 2021-06-09 NOTE — PHYSICAL THERAPY NOTE
PHYSICAL THERAPY EVALUATION  NAME:  Hood De La Cruz  DATE: 06/09/21    AGE:   70 y o  Mrn:   [id number]  ADMIT DX:  Other injury of unspecified body region, initial encounter [T14  8XXA]    Past Medical History:   Diagnosis Date    GERD (gastroesophageal reflux disease)     Hypertension        Past Surgical History:   Procedure Laterality Date    KNEE ARTHROSCOPY         Length Of Stay: 1    PHYSICAL THERAPY EVALUATION:        06/09/21 1100   Note Type   Note type Evaluation   Pain Assessment   Pain Assessment Tool 0-10   Pain Score 3   Pain Location/Orientation Orientation: Right;Location: Rib Cage   Pain Onset/Description Onset: Ongoing;Frequency: Constant/Continuous; Descriptor: Aching   Effect of Pain on Daily Activities Increased pain with activity   Patient's Stated Pain Goal No pain   Hospital Pain Intervention(s) Ambulation/increased activity;Repositioned   Home Living   Type of [address] [place] [address] Two level;1/2 bath on main level;Stairs to enter with rails  (1 ADALBERTO )   Home Equipment Walker;Cane   Additional Comments Patient reports living with his elderly mother  Patient states he assists her at baseline  Patient also has a supportive neighbor who is able to assist him if needed   Prior Function   Level of [place] Independent with ADLs and functional mobility   Lives With [facility] Help From Family; Other (Comment)  (neighbor )   ADL Assistance Independent   Falls in the last 6 months 1 to 4  (1- reason for this admission )   Comments Patient denies use of an assistive device for ambulation prior to admission   Restrictions/Precautions   Weight Bearing Precautions Per Order No   Other Precautions Pain   General   Family/Caregiver Present No   Cognition   Overall Cognitive Status WFL   Arousal/Participation Alert   Orientation Level Oriented X4   Memory Within functional limits   Following Commands Follows all commands and directions without difficulty   RUE Assessment   RUE Assessment WFL   LUE Assessment   LUE Assessment WFL   RLE Assessment   RLE Assessment WFL   Strength RLE   RLE Overall Strength 4+/5   LLE Assessment   LLE Assessment WFL   Strength LLE   LLE Overall Strength 4+/5   Bed Mobility   Additional Comments NA, patient seated out of bed in chair at time of PT eval   Transfers   Sit to Stand 5  Supervision   Stand to Sit 5  Supervision   Ambulation/Elevation   Gait pattern WNL   Gait Assistance 5  Supervision   Assistive Device None   Distance 80ft x 2    Stair Management Assistance 5  Supervision   Stair Management Technique One rail R   Number of Stairs 3   Balance   Static Sitting Fair +   Static Standing Fair   Ambulatory Fair -   Endurance Deficit   Endurance Deficit Yes   Endurance Deficit Description Pain   Activity Tolerance   Activity Tolerance Patient limited by pain   Medical Staff Made Aware Chise, OT; OT present for portion of PT eval due to pts traumatic injuries, and decreased activity tolerance compared to baseline which all limits pts overall physical performance    Nurse Made Aware Patient appropriate to be seen and mobilized per nursing   Assessment   Prognosis Good   Problem List Pain;Decreased mobility   Assessment Pt is 70 y o  male seen for PT evaluation s/p admit to [facility] on 6/8/2021  Two pt identifiers were used to confirm  Pt presented s/p fall off a hay bale  Patient [facility] and was transferred to [facility] for further medical management/ evaluation  Pt was admitted with a primary dx of:  Closed fracture of multiple ribs on right side, and other active problems including acute pain due to trauma, abdominal distension  PT now consulted for assessment of mobility and d/c needs  Pt with Up in chair orders  Pts current co morbidities affecting treatment include:  HTN, GERD, and personal factors including taking care of his elderly mother   Pts current clinical presentation is Evolving (medium complexity) due to Ongoing medical management for primary dx, Decreased activity tolerance compared to baseline, Continuous pulse oximetry monitoring     Upon evaluation, pt currently is requiring ; Supervision for transfers and Supervision for ambulation w/ no AD  Pt presents at PT eval functioning near baseline and currently w/ overall mobility deficits 2* to: pain, decreased activity tolerance compared to baseline  At conclusion of PT session pt returned back in chair with phone and call bell within reach  Pt denies any further questions at this time  PT is currently recommending home with support  D/C acute care PT at this time due to pt being near baseline in terms of functional mobility  Pt denies any mobility or safety concerns about returning home at d/c  Recommend pt continues to mobilize with nsg and restorative techs during hospital stay     Barriers to Discharge None   Barriers to Discharge Comments Patient denies any mobility or safety concerns about returning home at time of discharge   Goals   Patient Goals " to go home"   Plan   PT Frequency Other (Comment)  (DC PT)   Recommendation   PT Discharge Recommendation No rehabilitation needs  (home with support)   Equipment Recommended   (none at this time )   PT - OK to Discharge Yes  (When medically cleared)   Additional Comments Patient denies any mobility or safety concerns about returning home at time of discharge   AM-PAC Basic Mobility Inpatient   Turning in Bed Without Bedrails 4   Lying on Back to Sitting on Edge of Flat Bed 4   Moving Bed to Chair 4   Standing Up From Chair 4   Walk in Room 4   Climb 3-5 Stairs 4   Basic Mobility Inpatient Raw Score 24   Basic Mobility Standardized Score 57 68   Modified [place] Scale   Modified [place] Scale 2   Barthel Index   Feeding 10   Bathing 5   Grooming Score 5   Dressing Score 10   Bladder Score 10   Bowels Score 10   Toilet Use Score 10   Transfers (Bed/Chair) Score 15   Mobility (Level Surface) Score 15   Stairs Score 10 Barthel Index Score 100   Portions of the documentation may have been created using voice recognition software  Occasional wrong word or sound alike substitutions may have occurred due to the inherent limitations of the voice recognition software  Read the chart carefully and recognize, using context, where substitutions have occurred      Unique Merritt, PT, DPT

## 2021-06-10 VITALS
TEMPERATURE: 98.5 F | DIASTOLIC BLOOD PRESSURE: 62 MMHG | BODY MASS INDEX: 26.16 KG/M2 | HEIGHT: 65 IN | OXYGEN SATURATION: 96 % | HEART RATE: 65 BPM | RESPIRATION RATE: 16 BRPM | SYSTOLIC BLOOD PRESSURE: 103 MMHG | WEIGHT: 157 LBS

## 2021-06-10 PROCEDURE — NC001 PR NO CHARGE: Performed by: EMERGENCY MEDICINE

## 2021-06-10 PROCEDURE — 94760 N-INVAS EAR/PLS OXIMETRY 1: CPT

## 2021-06-10 PROCEDURE — 99238 HOSP IP/OBS DSCHRG MGMT 30/<: CPT | Performed by: EMERGENCY MEDICINE

## 2021-06-10 RX ORDER — OXYCODONE HYDROCHLORIDE 5 MG/1
2.5-5 TABLET ORAL EVERY 4 HOURS PRN
Qty: 30 TABLET | Refills: 0 | Status: SHIPPED | OUTPATIENT
Start: 2021-06-10

## 2021-06-10 RX ORDER — ACETAMINOPHEN 325 MG/1
975 TABLET ORAL EVERY 8 HOURS SCHEDULED
Qty: 90 TABLET | Refills: 0 | Status: SHIPPED | OUTPATIENT
Start: 2021-06-10 | End: 2021-06-20

## 2021-06-10 RX ORDER — METHOCARBAMOL 500 MG/1
500 TABLET, FILM COATED ORAL EVERY 8 HOURS SCHEDULED
Qty: 21 TABLET | Refills: 1 | Status: SHIPPED | OUTPATIENT
Start: 2021-06-10 | End: 2021-06-17

## 2021-06-10 RX ORDER — GABAPENTIN 100 MG/1
100 CAPSULE ORAL
Qty: 7 CAPSULE | Refills: 0 | Status: SHIPPED | OUTPATIENT
Start: 2021-06-10 | End: 2021-06-17

## 2021-06-10 RX ORDER — POLYETHYLENE GLYCOL 3350 17 G/17G
17 POWDER, FOR SOLUTION ORAL DAILY PRN
Qty: 119 G | Refills: 0 | Status: SHIPPED | OUTPATIENT
Start: 2021-06-10 | End: 2021-06-17

## 2021-06-10 RX ORDER — AMOXICILLIN 250 MG
2 CAPSULE ORAL DAILY
Qty: 14 TABLET | Refills: 0 | Status: SHIPPED | OUTPATIENT
Start: 2021-06-11 | End: 2021-06-18

## 2021-06-10 RX ADMIN — ENOXAPARIN SODIUM 30 MG: 30 INJECTION, SOLUTION INTRAVENOUS; SUBCUTANEOUS at 08:49

## 2021-06-10 RX ADMIN — PANTOPRAZOLE SODIUM 40 MG: 40 TABLET, DELAYED RELEASE ORAL at 05:48

## 2021-06-10 RX ADMIN — Medication 2000 UNITS: at 08:49

## 2021-06-10 RX ADMIN — METHOCARBAMOL 500 MG: 500 TABLET, FILM COATED ORAL at 05:48

## 2021-06-10 RX ADMIN — ACETAMINOPHEN 975 MG: 325 TABLET, FILM COATED ORAL at 05:47

## 2021-06-10 RX ADMIN — AMLODIPINE BESYLATE 5 MG: 5 TABLET ORAL at 08:49

## 2021-06-10 RX ADMIN — DOCUSATE SODIUM AND SENNOSIDES 2 TABLET: 8.6; 5 TABLET ORAL at 08:49

## 2021-06-10 NOTE — DISCHARGE SUMMARY
[facility]  Discharge- Lugene [place] 1945, 70 y o  male MRN: [id number]  Unit/Bed#: [facility] 627-01 Encounter: [id number]  Primary Care Provider: Diann Wells MD   Date and time admitted to hospital: 6/8/2021  1:19 PM    Fall  Assessment & Plan  - Status post mechanical fall approximately 4 feet onto right side with the below noted injuries  - Fall precautions   - PT and OT evaluation and treatment as indicated  - Case Management consultation for disposition planning  * Closed fracture of multiple ribs of right side  Assessment & Plan  - Multiple right-sided rib fractures (nondisplaced 7th & mildly displaced 9-10), present on admission   - Continue rib fracture protocol   - Continue to encourage incentive spirometer use and adequate pulmonary hygiene  Currently pulling 1,250 mL on I S   - Continue multimodal analgesic regimen  Appreciate APS evaluation and recommendations  Lovenox resumed on 06/09/2021 as patient did not require epidural catheter   - Patient on room air with no supplemental oxygen needed at this time  May provide supplemental oxygen via nasal cannula as needed to maintain saturations greater than or equal to 94%  - Repeat chest x-ray from 6/9/2021 reviewed  - PT and OT evaluation and treatment as indicated  Acute pain due to trauma  Assessment & Plan  - Acute pain to right chest wall injury  - Initiate multimodal analgesic regimen  - Acute Pain Service consulted for evaluation recommendations  - Lovenox resumed on 06/09/2021 as patient did not require epidural catheter  Abdominal distention  Assessment & Plan  - Abdominal distension improved, but still mildly persistent  Patient has continued to have bowel function and is tolerating diet  - Continue diet as tolerated  - Monitor abdominal exam and for signs/symptoms of ileus or constipation in setting of opioid use  - Continue bowel regimen          Discharge Summary - Trauma Service   Shannan Parsons 70 y o  male MRN: [id number]  Unit/Bed#: [facility] 627-01 Encounter: [id number]    Admission Date: 6/8/2021     Discharge Date: 6/10/2021    Admitting Diagnosis: Other injury of unspecified body region, initial encounter [T14  8XXA]    Discharge Diagnosis: See above  Attending and Service: Dr Bobbi Flower, Acute Care Surgical Services  Consulting Physician(s): NIRAJ  Imaging and Procedures Performed:     Xr Trauma Chest Portable    Result Date: 6/8/2021  Impression: No active pulmonary disease on examination which is somewhat limited secondary to low lung volumes  (Please however refer to subsequently performed CT chest report which demonstrates right-sided rib fractures ) Workstation performed: [id number]     Xr Chest Pa & Lateral (24 Hours After Admission)    Result Date: 6/10/2021  Impression: 1  Minimal atelectasis or pulmonary contusion at the left lung base  2   Fractures of the right 9th and 10th ribs  Workstation performed: [id number]     Xr Abdomen 1 View Kub    Result Date: 6/9/2021  Impression: There is interval development of diffuse gaseous distention of the large bowel  This is probably adynamic ileus, but could also be due to distal large bowel obstruction, particularly since patient does have a left inguinal hernia containing sigmoid colon seen on prior CT  Please correlate with physical exam   Consider repeat abdomen and pelvic CT to differentiate  I personally discussed this study with Dr Shirley Green on 6/9/2021 at 5:55 PM   Workstation performed: [id number]     Trauma - Ct Chest Abdomen Pelvis W Contrast    Result Date: 6/8/2021  Impression: Slightly displaced right posterior 9th and 10th rib fractures with nondisplaced 7th rib fracture  Contusion changes in the left lung peripherally  The study was marked in [facility] for immediate notification   Workstation performed: [id number]       Hospital Course: Shannan Parsons is a 79-year-old male who initially presented to [facility] following 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 a piece of farm equipment striking his right side  He was found to have multiple right-sided rib fractures and was transferred to [facility] for trauma evaluation  On arrival to [facility], he complained of significant right-sided posterior chest wall and flank pain with intermittent shortness of breath  He denied any his head or losing consciousness  His initial workup including labs in the above-noted imaging studies from [address] was reviewed by the trauma service at [facility]  He was admitted to the trauma service at [facility] with multiple right-sided rib fractures  He was placed on the rib fracture protocol and instructed on incentive spirometer use and adequate pulmonary hygiene as well as a multimodal analgesic regimen  He underwent a repeat chest x-ray on 06/09/2021 with above notes results  The night after his admission he did develop significant abdominal distension and discomfort with a KUB demonstrating interval development of diffuse gaseous distention of the large bowel indicating possible adynamic ileus  No intervention was required and the patient was able to pass significant amount of flatus and have a bowel movement with improvement in his abdominal distension  He tolerated a diet without nausea, vomiting or recurrence of this issue throughout the remainder of his hospitalization  The acute Pain service was consulted to assist with his pain management  He was able to be transitioned to an oral analgesic regimen by 06/10/2021  PT and OT evaluated and cleared for discharge home  Case Management assisted with disposition planning, the patient was discharged on 06/10/2021  On discharge, the patient is instructed to follow-up with the patient's primary care provider to review the events of the patient's recent hospitalization   The patient is instructed to follow-up in the Trauma Clinic as scheduled on 7/6/2021 at 1:15 PM   The patient should follow the provided discharge instructions  Condition at Discharge: good     Discharge instructions/Information to patient and family:   See after visit summary for information provided to patient and family  Provisions for Follow-Up Care:  See after visit summary for information related to follow-up care and any pertinent home health orders  Disposition: See After Visit Summary for discharge disposition information  Planned Readmission: No    Discharge Statement   I spent 25 minutes discharging the patient  This time was spent on the day of discharge  I had direct contact with the patient on the day of discharge  Additional documentation is required if more than 30 minutes were spent on discharge  Discharge Medications:  See after visit summary for reconciled discharge medications provided to patient and family        Jalen Ocampo PA-C  6/10/2021  09:27 AM

## 2021-06-10 NOTE — INCIDENTAL FINDINGS
The following findings require follow up:  Radiographic finding   Findings and Follow up required:      1) Liver is diffusely decreased in density consistent with fatty change  No additional workup or intervention necessary for this finding at this time  Recommend outpatient follow-up with primary care provider to review the finding and for surveillance as indicated  2)  Parapelvic cysts in the left kidney  No additional workup or intervention necessary for this finding at this time  Recommend outpatient follow-up with primary care provider to review the finding and for surveillance as indicated  3) Moderate left inguinal hernia containing decompressed loop of sigmoid colon  No additional workup or intervention necessary for this finding at this time  Recommend outpatient follow-up with primary care provider to review the finding and for surveillance as indicated       Follow up should be done within 2 week(s)    Please notify the following clinician to assist with the follow up:   Dr Sudhir Cazares

## 2021-06-10 NOTE — ASSESSMENT & PLAN NOTE
- Abdominal distension improved, but still mildly persistent  Patient has continued to have bowel function and is tolerating diet  - Continue diet as tolerated  - Monitor abdominal exam and for signs/symptoms of ileus or constipation in setting of opioid use  - Continue bowel regimen

## 2021-06-10 NOTE — PROGRESS NOTES
[address] [facility]  Progress Note - Niurka Walls 1945, 70 y o  male MRN: [id number]  Unit/Bed#: [facility] 627-01 Encounter: [id number]  Primary Care Provider: Megha Argueta MD   Date and time admitted to hospital: 6/8/2021  1:19 PM    Fall  Assessment & Plan  - Status post mechanical fall approximately 4 feet onto right side with the below noted injuries  - Fall precautions   - PT and OT evaluation and treatment as indicated  - Case Management consultation for disposition planning  * Closed fracture of multiple ribs of right side  Assessment & Plan  - Multiple right-sided rib fractures (nondisplaced 7th & mildly displaced 9-10), present on admission   - Continue rib fracture protocol   - Continue to encourage incentive spirometer use and adequate pulmonary hygiene  Currently pulling 1,250 mL on I S   - Continue multimodal analgesic regimen  Appreciate APS evaluation and recommendations  Lovenox resumed on 06/09/2021 as patient did not require epidural catheter   - Patient on room air with no supplemental oxygen needed at this time  May provide supplemental oxygen via nasal cannula as needed to maintain saturations greater than or equal to 94%  - Repeat chest x-ray from 6/9/2021 reviewed  - PT and OT evaluation and treatment as indicated  Acute pain due to trauma  Assessment & Plan  - Acute pain to right chest wall injury  - Initiate multimodal analgesic regimen  - Acute Pain Service consulted for evaluation recommendations  - Lovenox resumed on 06/09/2021 as patient did not require epidural catheter  Abdominal distention  Assessment & Plan  - Abdominal distension improved, but still mildly persistent  Patient has continued to have bowel function and is tolerating diet  - Continue diet as tolerated  - Monitor abdominal exam and for signs/symptoms of ileus or constipation in setting of opioid use  - Continue bowel regimen            Disposition: Anticipate discharge home today  Case Management available for disposition needs  SUBJECTIVE:  Chief Complaint:  I'm feeling alright      Subjective:  Patient feels okay this morning  He was able to get some rest overnight  He continues to have some pain in his right posterior chest wall, but denied any shortness of breath or difficulty breathing  He states that he has still been able to get up and move around pretty comfortably  He did have a little bit of lightheadedness this morning, but this resolved with breakfast   He has no new complaints otherwise        OBJECTIVE:     Meds/Allergies     Current Facility-Administered Medications:     acetaminophen (TYLENOL) tablet 975 mg, 975 mg, Oral, Q8H [address], Jaspal Mcgovern PA-C, 975 mg at 06/10/21 0547    amLODIPine (NORVASC) tablet 5 mg, 5 mg, Oral, Daily, Jaspal Mcgovern PA-C, 5 mg at 06/09/21 9963    cholecalciferol (VITAMIN D3) tablet 2,000 Units, 2,000 Units, Oral, Daily, Donnie Light PA-C, 2,000 Units at 06/09/21 1335    enoxaparin (LOVENOX) subcutaneous injection 30 mg, 30 mg, Subcutaneous, Q12H [address], Jaspal Mcgovern PA-C, 30 mg at 06/09/21 2139    gabapentin (NEURONTIN) capsule 100 mg, 100 mg, Oral, HS, Lashae L Arun, CRNP, 100 mg at 06/09/21 2139    lisinopril (ZESTRIL) tablet 20 mg, 20 mg, Oral, Daily, Jaspal Mcgovern PA-C, 20 mg at 06/09/21 1720    methocarbamol (ROBAXIN) tablet 500 mg, 500 mg, Oral, Q8H [address], Lashae L Arun, CRNP, 500 mg at 06/10/21 0548    naloxone (NARCAN) 0 04 mg/mL syringe 0 04 mg, 0 04 mg, Intravenous, Q1MIN PRN, Jaspal Mcgovern PA-C    ondansetron (ZOFRAN) injection 4 mg, 4 mg, Intravenous, Q4H PRN, Donnie Light PA-C    oxyCODONE (ROXICODONE) IR tablet 2 5 mg, 2 5 mg, Oral, Q4H PRN, Donnie Light PA-C    oxyCODONE (ROXICODONE) IR tablet 5 mg, 5 mg, Oral, Q4H PRN, Donnie Light PA-C, 5 mg at 06/09/21 1931    pantoprazole (PROTONIX) EC tablet 40 mg, 40 mg, Oral, Early Morning, Jaspal Mcgovern PA-C, 40 mg at 06/10/21 0549   polyethylene glycol (MIRALAX) packet 17 g, 17 g, Oral, Daily PRN, Donnie Hoodyessica PA-C, 17 g at 06/09/21 0018    senna-docusate sodium (SENOKOT S) 8 6-50 mg per tablet 2 tablet, 2 tablet, Oral, Daily, Donnie Kuldeep, PA-C, 2 tablet at 06/09/21 0497    simethicone (MYLICON) chewable tablet 80 mg, 80 mg, Oral, Q6H PRN, Teresita Broccoli, DO, 80 mg at 06/09/21 0017     Vitals:   Vitals:    06/10/21 0241   BP: 126/59   Pulse:    Resp: 16   Temp:    SpO2:        Intake/Output:  I/O       06/08 0701 - 06/09 0700 06/09 0701 - 06/10 0700 06/10 0701 - 06/11 0700    P  O  480 540     Total Intake(mL/kg) 480 (6 7) 540 (7 6)     Urine (mL/kg/hr) 400      Total Output 400      Net +80 +540            Unmeasured Urine Occurrence 4 x 1 x            Nutrition/GI Proph/Bowel Reg:  Regular diet; Protonix; Senokot S  Physical Exam:   GENERAL APPEARANCE: Patient in no acute distress  HEENT: NCAT; PERRL, EOMs intact; Mucous membranes moist  NECK / BACK:  No midline or paraspinal muscular tenderness in the neck or back  Continued right flank/ posterior chest wall/lateral back tenderness  CV: Regular rate and rhythm; no murmur/gallops/rubs appreciated  CHEST / LUNGS: Clear to auscultation; no wheezes/rales/rhonci  Continued, but somewhat improved right posterior chest wall tenderness without crepitus or deformity  ABD: NABS; soft; non-distended; non-tender  :  Voiding spontaneously  EXT: +2 pulses bilaterally upper & lower extremities; no edema  NEURO: GCS 15; no focal neurologic deficits; neurovascularly intact  SKIN: Warm, dry and well perfused; no rash; no jaundice  Right flank/posterior chest wall/lateral back abrasion healing      Invasive Devices     Peripheral Intravenous Line            Peripheral IV 06/08/21 Right Antecubital 1 day                 Lab Results: Results: I have personally reviewed pertinent reports   , BMP/CMP: No results found for: SODIUM, K, CL, CO2, ANIONGAP, BUN, CREATININE, GLUCOSE, CALCIUM, AST, ALT, ALKPHOS, PROT, BILITOT, EGFR and CBC: No results found for: WBC, HGB, HCT, MCV, PLT, ADJUSTEDWBC, MCH, MCHC, RDW, MPV, NRBC  Imaging/EKG Studies: Results: I have personally reviewed pertinent reports     and I have personally reviewed pertinent films in PACS  Other Studies: N/A  VTE Prophylaxis: Sequential compression device (Venodyne)  and Enoxaparin (Lovenox)       Governor LUZ Branch  6/10/2021 08:03 AM

## 2021-06-10 NOTE — PLAN OF CARE
Problem: PAIN - ADULT  Goal: Verbalizes/displays adequate comfort level or baseline comfort level  Description: Interventions:  - Encourage patient to monitor pain and request assistance  - Assess pain using appropriate pain scale  - Administer analgesics based on type and severity of pain and evaluate response  - Implement non-pharmacological measures as appropriate and evaluate response  - Consider cultural and social influences on pain and pain management  - Notify physician/advanced practitioner if interventions unsuccessful or patient reports new pain  Outcome: Progressing     Problem: INFECTION - ADULT  Goal: Absence or prevention of progression during hospitalization  Description: INTERVENTIONS:  - Assess and monitor for signs and symptoms of infection  - Monitor lab/diagnostic results  - Monitor all insertion sites, i e  indwelling lines, tubes, and drains  - Monitor endotracheal if appropriate and nasal secretions for changes in amount and color  - [place] appropriate cooling/warming therapies per order  - Administer medications as ordered  - Instruct and encourage patient and family to use good hand hygiene technique  - Identify and instruct in appropriate isolation precautions for identified infection/condition  Outcome: Progressing  Goal: Absence of fever/infection during neutropenic period  Description: INTERVENTIONS:  - Monitor WBC    Outcome: Progressing     Problem: SAFETY ADULT  Goal: Patient will remain free of falls  Description: INTERVENTIONS:  - Assess patient frequently for physical needs  -  Identify cognitive and physical deficits and behaviors that affect risk of falls    -  [place] fall precautions as indicated by assessment   - Educate patient/family on patient safety including physical limitations  - Instruct patient to call for assistance with activity based on assessment  - Modify environment to reduce risk of injury  - Consider OT/PT consult to assist with strengthening/mobility  Outcome: Progressing  Goal: Maintain or return to baseline ADL function  Description: INTERVENTIONS:  -  Assess patient's ability to carry out ADLs; assess patient's baseline for ADL function and identify physical deficits which impact ability to perform ADLs (bathing, care of mouth/teeth, toileting, grooming, dressing, etc )  - Assess/evaluate cause of self-care deficits   - Assess range of motion  - Assess patient's mobility; develop plan if impaired  - Assess patient's need for assistive devices and provide as appropriate  - Encourage maximum independence but intervene and supervise when necessary  - Involve family in performance of ADLs  - Assess for home care needs following discharge   - Consider OT consult to assist with ADL evaluation and planning for discharge  - Provide patient education as appropriate  Outcome: Progressing  Goal: Maintain or return mobility status to optimal level  Description: INTERVENTIONS:  - Assess patient's baseline mobility status (ambulation, transfers, stairs, etc )    - Identify cognitive and physical deficits and behaviors that affect mobility  - Identify mobility aids required to assist with transfers and/or ambulation (gait belt, sit-to-stand, lift, walker, cane, etc )  - [place] fall precautions as indicated by assessment  - Record patient progress and toleration of activity level on Mobility SBAR; progress patient to next Phase/Stage  - Instruct patient to call for assistance with activity based on assessment  - Consider rehabilitation consult to assist with strengthening/weightbearing, etc   Outcome: Progressing     Problem: DISCHARGE PLANNING  Goal: Discharge to home or other facility with appropriate resources  Description: INTERVENTIONS:  - Identify barriers to discharge w/patient and caregiver  - Arrange for needed discharge resources and transportation as appropriate  - Identify discharge learning needs (meds, wound care, etc )  - Arrange for interpretive services to assist at discharge as needed  - Refer to Case Management Department for coordinating discharge planning if the patient needs post-hospital services based on physician/advanced practitioner order or complex needs related to functional status, cognitive ability, or social support system  Outcome: Progressing     Problem: Knowledge Deficit  Goal: Patient/family/caregiver demonstrates understanding of disease process, treatment plan, medications, and discharge instructions  Description: Complete learning assessment and assess knowledge base  Interventions:  - Provide teaching at level of understanding  - Provide teaching via preferred learning methods  Outcome: Progressing     Problem: Nutrition/Hydration-ADULT  Goal: Nutrient/Hydration intake appropriate for improving, restoring or maintaining nutritional needs  Description: Monitor and assess patient's nutrition/hydration status for malnutrition  Collaborate with interdisciplinary team and initiate plan and interventions as ordered  Monitor patient's weight and dietary intake as ordered or per policy  Utilize nutrition screening tool and intervene as necessary  Determine patient's food preferences and provide high-protein, high-caloric foods as appropriate       INTERVENTIONS:  - Monitor oral intake, urinary output, labs, and treatment plans  - Assess nutrition and hydration status and recommend course of action  - Evaluate amount of meals eaten  - Assist patient with eating if necessary   - Allow adequate time for meals  - Recommend/ encourage appropriate diets, oral nutritional supplements, and vitamin/mineral supplements  - Order, calculate, and assess calorie counts as needed  - Recommend, monitor, and adjust tube feedings and TPN/PPN based on assessed needs  - Assess need for intravenous fluids  - Provide specific nutrition/hydration education as appropriate  - Include patient/family/caregiver in decisions related to nutrition  Outcome: Progressing     Problem: Potential for Falls  Goal: Patient will remain free of falls  Description: INTERVENTIONS:  - Assess patient frequently for physical needs  -  Identify cognitive and physical deficits and behaviors that affect risk of falls    -  [place] fall precautions as indicated by assessment   - Educate patient/family on patient safety including physical limitations  - Instruct patient to call for assistance with activity based on assessment  - Modify environment to reduce risk of injury  - Consider OT/PT consult to assist with strengthening/mobility  Outcome: Progressing

## 2021-06-10 NOTE — DISCHARGE INSTRUCTIONS
Traumatic Rib Fracture Discharge Instructions: You fractured multiple ribs, including slightly displaced right posterior 9th and 10th rib fractures as well as a nondisplaced 7th rib fracture  These ribs are in the lower portion of your right posterior chest wall, or right mid to lower back area  Activity:  - Walking and normal light activities are encouraged  Normal daily activities including climbing steps are okay  - Avoid lifting greater than 10 pounds, any strenuous activities and/or exercise, and contact sports until cleared by the trauma service  - No driving while taking narcotic pain medications  - Continue using the incentive spirometer at least 10 times every hour while awake  Return to work:    - You may return to work once you are cleared by the trauma service  Medications:    - You should continue your current medication regimen after discharge unless otherwise instructed  Please refer to your discharge medication list for further details  - Please take the pain medications as directed  - You are encouraged to use non-narcotic pain medications first and whenever possible  Reserve the use of narcotic pain medication for moderate to severe pain not controlled by non-narcotic medications  - You may become constipated, especially if taking pain medications  You may take any over the counter stool softeners or laxatives as needed  Examples: Milk of Magnesia, Colace, Senna  Additional Instructions:  - If you have any questions or concerns after discharge please call the office   - Call office or return to ER if fever greater than 101, chills, worsening/uncontrollable pain, develop productive cough, increasing shortness of breath, and/or difficulty breathing

## 2021-06-10 NOTE — ASSESSMENT & PLAN NOTE
- Multiple right-sided rib fractures (nondisplaced 7th & mildly displaced 9-10), present on admission   - Continue rib fracture protocol   - Continue to encourage incentive spirometer use and adequate pulmonary hygiene  Currently pulling 1,250 mL on I S   - Continue multimodal analgesic regimen  Appreciate APS evaluation and recommendations  Lovenox resumed on 06/09/2021 as patient did not require epidural catheter   - Patient on room air with no supplemental oxygen needed at this time  May provide supplemental oxygen via nasal cannula as needed to maintain saturations greater than or equal to 94%  - Repeat chest x-ray from 6/9/2021 reviewed  - PT and OT evaluation and treatment as indicated

## 2021-06-12 ENCOUNTER — NURSE TRIAGE (OUTPATIENT)
Dept: OTHER | Facility: OTHER | Age: 76
End: 2021-06-12

## 2021-06-12 NOTE — TELEPHONE ENCOUNTER
Regarding: Swollen Feet  ----- Message from Tsering Sheets sent at 6/12/2021  7:56 AM EDT -----  " He had a closed fracture of multiple ribs of right side and today his feet are swollen "

## 2021-06-12 NOTE — TELEPHONE ENCOUNTER
Reason for Disposition   [1] MILD swelling of both ankles (i e , pedal edema) AND [2] new onset or worsening    Answer Assessment - Initial Assessment Questions  1  ONSET: "When did the swelling start?" (e g , minutes, hours, days)      Today since this morning  2  LOCATION: "What part of the leg is swollen?"  "Are both legs swollen or just one leg?"      Both ankle and feet  3  SEVERITY: "How bad is the swelling?" (e g , localized; mild, moderate, severe)         Mild- from ankle to toes  No pitting- says skin bounces back quickly  4  REDNESS: "Does the swelling look red or infected?"      Denies redness or streaks  5  PAIN: "Is the swelling painful to touch?" If so, ask: "How painful is it?"   (Scale 1-10; mild, moderate or severe)      No pain with or without touch  6  FEVER: "Do you have a fever?" If so, ask: "What is it, how was it measured, and when did it start?"       Denies fever  7  CAUSE: "What do you think is causing the leg swelling?"      Unknown- was uncertain if rib fractures could have anything to do with it  8  MEDICAL HISTORY: "Do you have a history of heart failure, kidney disease, liver failure, or cancer?"        9  RECURRENT SYMPTOM: "Have you had leg swelling before?" If so, ask: "When was the last time?" "What happened that time?"      Denies  10  OTHER SYMPTOMS: "Do you have any other symptoms?" (e g , chest pain, difficulty breathing)        Denies chest pain  Denies SOB  Is able to walk without difficulty  Denies injury to both feet  Protocols used: LEG SWELLING AND EDEMA-ADULT-    Pt called under [facility] because of recent rib fractures  Symptoms are not related to rib fractures  Recommended pt follow up with his PCP regarding symptoms or seek care at [facility] or [facility] if symptoms should worsen  Pt verbalized understanding

## 2021-07-06 ENCOUNTER — OFFICE VISIT (OUTPATIENT)
Dept: SURGERY | Facility: CLINIC | Age: 76
End: 2021-07-06
Payer: MEDICARE

## 2021-07-06 VITALS — HEART RATE: 95 BPM | BODY MASS INDEX: 29.49 KG/M2 | HEIGHT: 65 IN | TEMPERATURE: 98 F | WEIGHT: 177 LBS

## 2021-07-06 DIAGNOSIS — S22.41XD CLOSED FRACTURE OF MULTIPLE RIBS OF RIGHT SIDE WITH ROUTINE HEALING, SUBSEQUENT ENCOUNTER: Primary | ICD-10-CM

## 2021-07-06 PROCEDURE — 99213 OFFICE O/P EST LOW 20 MIN: CPT | Performed by: SURGERY

## 2021-07-06 PROCEDURE — 1123F ACP DISCUSS/DSCN MKR DOCD: CPT | Performed by: SURGERY

## 2021-07-06 NOTE — PROGRESS NOTES
Office Visit - Trauma  Diana Rico MRN: [id number]  Encounter: [id number]    Assessment and Plan    Problem List Items Addressed This Visit        Musculoskeletal and Integument    Closed fracture of multiple ribs of right side - Primary     - No further workup at this time  - no further imaging  - told to call back with questions or concerns  - can start slowly increasing his weight restriction  - cleared to return to driving at this time  - told to continue outpatient follow-up with his PCP  - saturation is 99% on room air  - currently getting 2000 on IS             Disposition: DC from Trauma service at this time  Follow-up as needed  No further workup  Chief Complaint:  Diana Rico is a 70 y o  male who presents for Follow-up (complaint of intermittent right side rib cage pain  )    Subjective    Patient states that he is overall feeling better  Reports the last mathew is he is unable to sleep flat  States he is also some fluid / edema in his lower extremities which is resolving  He reports he has seen his family doctor and currently receiving "water pill " patient otherwise symptoms  Reports he wants to return to driving and start increasing his weight restriction  He denies any new dyspnea on exertion  No new chest pain or shortness of breath  No cough or congestion  No fevers, chills, sweats      Past Medical History  Past Medical History:   Diagnosis Date    GERD (gastroesophageal reflux disease)     Hypertension        Past Surgical History  Past Surgical History:   Procedure Laterality Date    KNEE ARTHROSCOPY         Family History  Family History   Problem Relation Age of Onset    Hypertension Father     Diabetes Father        Social History  Social History     Socioeconomic History    Marital status: Single     Spouse name: Not on file    Number of children: Not on file    Years of education: Not on file    Highest education level: Not on file   Occupational History    Not on file   Tobacco Use    Smoking status: Never Smoker    Smokeless tobacco: Never Used   Vaping Use    Vaping Use: Never used   Substance and Sexual Activity    Alcohol use: Yes     Comment: 1-2 drinks a month    Drug use: Never    Sexual activity: Not Currently   Other Topics Concern    Not on file   Social History Narrative    Caffeine use- coffee on occasion     Social Determinants of Health     Financial Resource Strain:     Difficulty of Paying Living Expenses:    Food Insecurity:     Worried About Running Out of Food in the Last Year:     Ran Out of Food in the Last Year:    Transportation Needs:     Lack of Transportation (Medical):      Lack of Transportation (Non-Medical):    Physical Activity:     Days of Exercise per Week:     Minutes of Exercise per Session:    Stress:     Feeling of Stress :    Social Connections:     Frequency of Communication with Friends and Family:     Frequency of Social Gatherings with Friends and Family:     Attends Buddhism Services:     Active Member of Clubs or Organizations:     Attends Club or Organization Meetings:     Marital Status:    Intimate Partner Violence:     Fear of Current or Ex-Partner:     Emotionally Abused:     Physically Abused:     Sexually Abused:         Medications  Current Outpatient Medications on File Prior to Visit   Medication Sig Dispense Refill    cholecalciferol (VITAMIN D3) 1,000 units tablet Take 2,000 Units by mouth daily      [employer]-3 Krill Oil 300 MG CAPS Take by mouth      lisinopril (ZESTRIL) 10 mg tablet Take 20 mg by mouth daily       omeprazole (PriLOSEC) 20 mg delayed release capsule Take 20 mg by mouth daily      amLODIPine (NORVASC) 5 mg tablet Take 5 mg by mouth daily (Patient not taking: Reported on 7/6/2021)      gabapentin (NEURONTIN) 100 mg capsule Take 1 capsule (100 mg total) by mouth daily at bedtime for 7 days 7 capsule 0    methocarbamol (ROBAXIN) 500 mg tablet Take 1 tablet (500 mg total) by mouth every 8 (eight) hours for 7 days 21 tablet 1    oxyCODONE (ROXICODONE) 5 mg immediate release tablet Take 0 5-1 tablets (2 5-5 mg total) by mouth every 4 (four) hours as needed for moderate pain or severe painMax Daily Amount: 30 mg (Patient not taking: Reported on 7/6/2021) 30 tablet 0    polyethylene glycol (MIRALAX) 17 g packet Take 17 g by mouth daily as needed (Constipation) for up to 7 days 119 g 0    senna-docusate sodium (SENOKOT S) 8 6-50 mg per tablet Take 2 tablets by mouth daily for 7 days 14 tablet 0     No current facility-administered medications on file prior to visit  Allergies  Allergies   Allergen Reactions    Sulfa Antibiotics      As a child       Review of Systems   Constitutional: Negative for activity change, appetite change and fever  HENT: Negative for ear discharge, ear pain, rhinorrhea, sore throat and trouble swallowing  Eyes: Negative for photophobia, pain and redness  Respiratory: Negative for apnea, cough, chest tightness, shortness of breath and stridor  Cardiovascular: Negative for chest pain and palpitations  Gastrointestinal: Negative for abdominal distention, abdominal pain, nausea and vomiting  Endocrine: Negative for cold intolerance and heat intolerance  Genitourinary: Negative  Musculoskeletal: Negative for arthralgias, back pain, neck pain and neck stiffness  Skin: Negative  Neurological: Negative for dizziness, weakness, light-headedness and numbness  Hematological: Negative  Objective  Vitals:    07/06/21 1313   Pulse: 95   Temp: 98 °F (36 7 °C)       Physical Exam  Vitals reviewed  Constitutional:       Appearance: Normal appearance  HENT:      Head: Normocephalic and atraumatic  Nose: Nose normal    Cardiovascular:      Rate and Rhythm: Normal rate and regular rhythm  Pulses: Normal pulses  Heart sounds: Normal heart sounds  Pulmonary:      Effort: Pulmonary effort is normal  No respiratory distress  Breath sounds: Normal breath sounds  Chest:      Chest wall: No tenderness  Abdominal:      General: There is no distension  Palpations: Abdomen is soft  Tenderness: There is no abdominal tenderness  There is no guarding  Musculoskeletal:      Cervical back: Neck supple  Neurological:      Mental Status: He is alert and oriented to person, place, and time

## 2021-07-06 NOTE — ASSESSMENT & PLAN NOTE
- No further workup at this time  - no further imaging  - told to call back with questions or concerns  - can start slowly increasing his weight restriction  - cleared to return to driving at this time  - told to continue outpatient follow-up with his PCP  - saturation is 99% on room air  - currently getting 2000 on IS

## 2021-07-12 ENCOUNTER — HOSPITAL ENCOUNTER (OUTPATIENT)
Dept: RADIOLOGY | Facility: HOSPITAL | Age: 76
Discharge: HOME | End: 2021-07-12
Payer: MEDICARE

## 2021-07-12 ENCOUNTER — APPOINTMENT (OUTPATIENT)
Dept: LAB | Facility: HOSPITAL | Age: 76
End: 2021-07-12
Payer: MEDICARE

## 2021-07-12 DIAGNOSIS — R06.00 DYSPNEA, UNSPECIFIED TYPE: ICD-10-CM

## 2021-07-12 DIAGNOSIS — R53.83 FATIGUE, UNSPECIFIED TYPE: ICD-10-CM

## 2021-07-12 DIAGNOSIS — R60.9 EDEMA, UNSPECIFIED TYPE: ICD-10-CM

## 2021-07-12 DIAGNOSIS — S22.31XA CLOSED FRACTURE OF ONE RIB OF RIGHT SIDE, INITIAL ENCOUNTER: ICD-10-CM

## 2021-07-12 DIAGNOSIS — I10 HYPERTENSION, UNSPECIFIED TYPE: ICD-10-CM

## 2021-07-12 LAB
ALBUMIN SERPL BCP-MCNC: 3.7 G/DL (ref 3.5–5)
ALP SERPL-CCNC: 68 U/L (ref 46–116)
ALT SERPL W P-5'-P-CCNC: 62 U/L (ref 12–78)
ANION GAP SERPL CALCULATED.3IONS-SCNC: 6 MMOL/L (ref 4–13)
AST SERPL W P-5'-P-CCNC: 26 U/L (ref 5–45)
BILIRUB SERPL-MCNC: 0.56 MG/DL (ref 0.2–1)
BUN SERPL-MCNC: 20 MG/DL (ref 5–25)
CALCIUM SERPL-MCNC: 9 MG/DL (ref 8.3–10.1)
CHLORIDE SERPL-SCNC: 103 MMOL/L (ref 100–108)
CO2 SERPL-SCNC: 28 MMOL/L (ref 21–32)
CREAT SERPL-MCNC: 1.11 MG/DL (ref 0.6–1.3)
ERYTHROCYTE [DISTWIDTH] IN BLOOD BY AUTOMATED COUNT: 12.5 % (ref 11.6–15.1)
GFR SERPL CREATININE-BSD FRML MDRD: 66 ML/MIN/1.73SQ M
GLUCOSE SERPL-MCNC: 112 MG/DL (ref 65–140)
HCT VFR BLD AUTO: 45 % (ref 36.5–49.3)
HGB BLD-MCNC: 15.2 G/DL (ref 12–17)
MCH RBC QN AUTO: 33.1 PG (ref 26.8–34.3)
MCHC RBC AUTO-ENTMCNC: 33.8 G/DL (ref 31.4–37.4)
MCV RBC AUTO: 98 FL (ref 82–98)
NT-PROBNP SERPL-MCNC: 107 PG/ML
PLATELET # BLD AUTO: 202 THOUSANDS/UL (ref 149–390)
PMV BLD AUTO: 9.6 FL (ref 8.9–12.7)
POTASSIUM SERPL-SCNC: 4.4 MMOL/L (ref 3.5–5.3)
PROT SERPL-MCNC: 7.8 G/DL (ref 6.4–8.2)
RBC # BLD AUTO: 4.59 MILLION/UL (ref 3.88–5.62)
SODIUM SERPL-SCNC: 137 MMOL/L (ref 136–145)
TSH SERPL DL<=0.05 MIU/L-ACNC: 3.73 UIU/ML (ref 0.36–3.74)
WBC # BLD AUTO: 6.43 THOUSAND/UL (ref 4.31–10.16)

## 2021-07-12 PROCEDURE — 85027 COMPLETE CBC AUTOMATED: CPT

## 2021-07-12 PROCEDURE — 71046 X-RAY EXAM CHEST 2 VIEWS: CPT

## 2021-07-12 PROCEDURE — 83880 ASSAY OF NATRIURETIC PEPTIDE: CPT

## 2021-07-12 PROCEDURE — 80053 COMPREHEN METABOLIC PANEL: CPT

## 2021-07-12 PROCEDURE — 36415 COLL VENOUS BLD VENIPUNCTURE: CPT

## 2021-07-12 PROCEDURE — 84443 ASSAY THYROID STIM HORMONE: CPT

## 2021-12-01 ENCOUNTER — IMMUNIZATIONS (OUTPATIENT)
Dept: FAMILY MEDICINE CLINIC | Facility: HOSPITAL | Age: 76
End: 2021-12-01

## 2021-12-01 DIAGNOSIS — Z23 ENCOUNTER FOR IMMUNIZATION: Primary | ICD-10-CM

## 2021-12-01 PROCEDURE — 91306 COVID-19 MODERNA VACC 0.25 ML BOOSTER: CPT

## 2021-12-01 PROCEDURE — 0064A COVID-19 MODERNA VACC 0.25 ML BOOSTER: CPT

## 2021-12-16 ENCOUNTER — HOSPITAL ENCOUNTER (EMERGENCY)
Facility: HOSPITAL | Age: 76
Discharge: HOME | End: 2021-12-16
Attending: EMERGENCY MEDICINE | Admitting: EMERGENCY MEDICINE
Payer: MEDICARE

## 2021-12-16 ENCOUNTER — APPOINTMENT (EMERGENCY)
Dept: CT IMAGING | Facility: HOSPITAL | Age: 76
End: 2021-12-16
Payer: MEDICARE

## 2021-12-16 ENCOUNTER — APPOINTMENT (EMERGENCY)
Dept: RADIOLOGY | Facility: HOSPITAL | Age: 76
End: 2021-12-16
Payer: MEDICARE

## 2021-12-16 VITALS
OXYGEN SATURATION: 95 % | DIASTOLIC BLOOD PRESSURE: 63 MMHG | HEIGHT: 65 IN | RESPIRATION RATE: 18 BRPM | HEART RATE: 79 BPM | BODY MASS INDEX: 29.49 KG/M2 | SYSTOLIC BLOOD PRESSURE: 149 MMHG | WEIGHT: 177 LBS | TEMPERATURE: 99.4 F

## 2021-12-16 DIAGNOSIS — I10 HYPERTENSION: ICD-10-CM

## 2021-12-16 DIAGNOSIS — N39.0 UTI (URINARY TRACT INFECTION): Primary | ICD-10-CM

## 2021-12-16 DIAGNOSIS — R91.8 LUNG NODULES: ICD-10-CM

## 2021-12-16 DIAGNOSIS — K76.0 HEPATIC STEATOSIS: ICD-10-CM

## 2021-12-16 DIAGNOSIS — R79.89 ELEVATED LFTS: ICD-10-CM

## 2021-12-16 DIAGNOSIS — K40.90 LEFT INGUINAL HERNIA: ICD-10-CM

## 2021-12-16 LAB
2HR DELTA HS TROPONIN: 2 NG/L
4HR DELTA HS TROPONIN: 1 NG/L
ALBUMIN SERPL BCP-MCNC: 3.4 G/DL (ref 3.5–5)
ALP SERPL-CCNC: 58 U/L (ref 46–116)
ALT SERPL W P-5'-P-CCNC: 90 U/L (ref 12–78)
ANION GAP SERPL CALCULATED.3IONS-SCNC: 7 MMOL/L (ref 4–13)
APTT PPP: 29 SECONDS (ref 23–37)
AST SERPL W P-5'-P-CCNC: 59 U/L (ref 5–45)
BACTERIA UR QL AUTO: ABNORMAL /HPF
BASOPHILS # BLD AUTO: 0.02 THOUSANDS/ΜL (ref 0–0.1)
BASOPHILS NFR BLD AUTO: 0 % (ref 0–1)
BILIRUB SERPL-MCNC: 0.88 MG/DL (ref 0.2–1)
BILIRUB UR QL STRIP: NEGATIVE
BUN SERPL-MCNC: 22 MG/DL (ref 5–25)
CALCIUM ALBUM COR SERPL-MCNC: 9.7 MG/DL (ref 8.3–10.1)
CALCIUM SERPL-MCNC: 9.2 MG/DL (ref 8.3–10.1)
CARDIAC TROPONIN I PNL SERPL HS: 5 NG/L
CARDIAC TROPONIN I PNL SERPL HS: 6 NG/L
CARDIAC TROPONIN I PNL SERPL HS: 7 NG/L
CHLORIDE SERPL-SCNC: 102 MMOL/L (ref 100–108)
CLARITY UR: ABNORMAL
CO2 SERPL-SCNC: 27 MMOL/L (ref 21–32)
COLOR UR: YELLOW
CREAT SERPL-MCNC: 1.21 MG/DL (ref 0.6–1.3)
EOSINOPHIL # BLD AUTO: 0.01 THOUSAND/ΜL (ref 0–0.61)
EOSINOPHIL NFR BLD AUTO: 0 % (ref 0–6)
ERYTHROCYTE [DISTWIDTH] IN BLOOD BY AUTOMATED COUNT: 12 % (ref 11.6–15.1)
FLUAV RNA RESP QL NAA+PROBE: NEGATIVE
FLUBV RNA RESP QL NAA+PROBE: NEGATIVE
GFR SERPL CREATININE-BSD FRML MDRD: 59 ML/MIN/1.73SQ M
GLUCOSE SERPL-MCNC: 130 MG/DL (ref 65–140)
GLUCOSE UR STRIP-MCNC: NEGATIVE MG/DL
HCT VFR BLD AUTO: 45 % (ref 36.5–49.3)
HGB BLD-MCNC: 15.4 G/DL (ref 12–17)
HGB UR QL STRIP.AUTO: ABNORMAL
IMM GRANULOCYTES # BLD AUTO: 0.05 THOUSAND/UL (ref 0–0.2)
IMM GRANULOCYTES NFR BLD AUTO: 0 % (ref 0–2)
INR PPP: 1.01 (ref 0.84–1.19)
KETONES UR STRIP-MCNC: NEGATIVE MG/DL
LACTATE SERPL-SCNC: 0.8 MMOL/L (ref 0.5–2)
LEUKOCYTE ESTERASE UR QL STRIP: ABNORMAL
LYMPHOCYTES # BLD AUTO: 0.86 THOUSANDS/ΜL (ref 0.6–4.47)
LYMPHOCYTES NFR BLD AUTO: 7 % (ref 14–44)
MCH RBC QN AUTO: 33.3 PG (ref 26.8–34.3)
MCHC RBC AUTO-ENTMCNC: 34.2 G/DL (ref 31.4–37.4)
MCV RBC AUTO: 97 FL (ref 82–98)
MONOCYTES # BLD AUTO: 1.35 THOUSAND/ΜL (ref 0.17–1.22)
MONOCYTES NFR BLD AUTO: 10 % (ref 4–12)
NEUTROPHILS # BLD AUTO: 10.96 THOUSANDS/ΜL (ref 1.85–7.62)
NEUTS SEG NFR BLD AUTO: 83 % (ref 43–75)
NITRITE UR QL STRIP: NEGATIVE
NON-SQ EPI CELLS URNS QL MICRO: ABNORMAL /HPF
NRBC BLD AUTO-RTO: 0 /100 WBCS
PH UR STRIP.AUTO: 5.5 [PH]
PLATELET # BLD AUTO: 212 THOUSANDS/UL (ref 149–390)
PMV BLD AUTO: 9.6 FL (ref 8.9–12.7)
POTASSIUM SERPL-SCNC: 4.3 MMOL/L (ref 3.5–5.3)
PROT SERPL-MCNC: 7.5 G/DL (ref 6.4–8.2)
PROT UR STRIP-MCNC: NEGATIVE MG/DL
PROTHROMBIN TIME: 12.8 SECONDS (ref 11.6–14.5)
RBC # BLD AUTO: 4.62 MILLION/UL (ref 3.88–5.62)
RBC #/AREA URNS AUTO: ABNORMAL /HPF
RSV RNA RESP QL NAA+PROBE: NEGATIVE
SARS-COV-2 RNA RESP QL NAA+PROBE: NEGATIVE
SODIUM SERPL-SCNC: 136 MMOL/L (ref 136–145)
SP GR UR STRIP.AUTO: 1.01 (ref 1–1.03)
UROBILINOGEN UR QL STRIP.AUTO: 0.2 E.U./DL
WBC # BLD AUTO: 13.25 THOUSAND/UL (ref 4.31–10.16)
WBC #/AREA URNS AUTO: ABNORMAL /HPF

## 2021-12-16 PROCEDURE — 36415 COLL VENOUS BLD VENIPUNCTURE: CPT | Performed by: PHYSICIAN ASSISTANT

## 2021-12-16 PROCEDURE — 99284 EMERGENCY DEPT VISIT MOD MDM: CPT

## 2021-12-16 PROCEDURE — G1004 CDSM NDSC: HCPCS

## 2021-12-16 PROCEDURE — 93005 ELECTROCARDIOGRAM TRACING: CPT

## 2021-12-16 PROCEDURE — 81001 URINALYSIS AUTO W/SCOPE: CPT | Performed by: PHYSICIAN ASSISTANT

## 2021-12-16 PROCEDURE — 0241U HB NFCT DS VIR RESP RNA 4 TRGT: CPT | Performed by: PHYSICIAN ASSISTANT

## 2021-12-16 PROCEDURE — 71260 CT THORAX DX C+: CPT

## 2021-12-16 PROCEDURE — 71045 X-RAY EXAM CHEST 1 VIEW: CPT

## 2021-12-16 PROCEDURE — 87186 SC STD MICRODIL/AGAR DIL: CPT | Performed by: PHYSICIAN ASSISTANT

## 2021-12-16 PROCEDURE — 85730 THROMBOPLASTIN TIME PARTIAL: CPT | Performed by: PHYSICIAN ASSISTANT

## 2021-12-16 PROCEDURE — 80053 COMPREHEN METABOLIC PANEL: CPT | Performed by: PHYSICIAN ASSISTANT

## 2021-12-16 PROCEDURE — 74177 CT ABD & PELVIS W/CONTRAST: CPT

## 2021-12-16 PROCEDURE — 87077 CULTURE AEROBIC IDENTIFY: CPT | Performed by: PHYSICIAN ASSISTANT

## 2021-12-16 PROCEDURE — 87086 URINE CULTURE/COLONY COUNT: CPT | Performed by: PHYSICIAN ASSISTANT

## 2021-12-16 PROCEDURE — 84484 ASSAY OF TROPONIN QUANT: CPT | Performed by: PHYSICIAN ASSISTANT

## 2021-12-16 PROCEDURE — 85025 COMPLETE CBC W/AUTO DIFF WBC: CPT | Performed by: PHYSICIAN ASSISTANT

## 2021-12-16 PROCEDURE — 83605 ASSAY OF LACTIC ACID: CPT | Performed by: PHYSICIAN ASSISTANT

## 2021-12-16 PROCEDURE — 85610 PROTHROMBIN TIME: CPT | Performed by: PHYSICIAN ASSISTANT

## 2021-12-16 PROCEDURE — 87040 BLOOD CULTURE FOR BACTERIA: CPT | Performed by: PHYSICIAN ASSISTANT

## 2021-12-16 PROCEDURE — 99285 EMERGENCY DEPT VISIT HI MDM: CPT | Performed by: PHYSICIAN ASSISTANT

## 2021-12-16 RX ORDER — ACETAMINOPHEN 325 MG/1
650 TABLET ORAL ONCE
Status: COMPLETED | OUTPATIENT
Start: 2021-12-16 | End: 2021-12-16

## 2021-12-16 RX ORDER — CEPHALEXIN 500 MG/1
500 CAPSULE ORAL EVERY 8 HOURS SCHEDULED
Qty: 30 CAPSULE | Refills: 0 | Status: SHIPPED | OUTPATIENT
Start: 2021-12-16 | End: 2021-12-26

## 2021-12-16 RX ORDER — CEFTRIAXONE 1 G/50ML
1000 INJECTION, SOLUTION INTRAVENOUS ONCE
Status: COMPLETED | OUTPATIENT
Start: 2021-12-16 | End: 2021-12-16

## 2021-12-16 RX ADMIN — ACETAMINOPHEN 650 MG: 325 TABLET, FILM COATED ORAL at 10:20

## 2021-12-16 RX ADMIN — IOHEXOL 100 ML: 350 INJECTION, SOLUTION INTRAVENOUS at 12:38

## 2021-12-16 RX ADMIN — CEFTRIAXONE 1000 MG: 1 INJECTION, SOLUTION INTRAVENOUS at 12:14

## 2021-12-16 RX ADMIN — SODIUM CHLORIDE 1000 ML: 0.9 INJECTION, SOLUTION INTRAVENOUS at 10:27

## 2021-12-17 LAB
ATRIAL RATE: 99 BPM
P AXIS: 34 DEGREES
PR INTERVAL: 146 MS
QRS AXIS: 40 DEGREES
QRSD INTERVAL: 80 MS
QT INTERVAL: 358 MS
QTC INTERVAL: 459 MS
T WAVE AXIS: 35 DEGREES
VENTRICULAR RATE: 99 BPM

## 2021-12-17 PROCEDURE — 93010 ELECTROCARDIOGRAM REPORT: CPT | Performed by: INTERNAL MEDICINE

## 2021-12-18 LAB — BACTERIA UR CULT: ABNORMAL

## 2021-12-20 DIAGNOSIS — N39.0 UTI (URINARY TRACT INFECTION): Primary | ICD-10-CM

## 2021-12-20 RX ORDER — CIPROFLOXACIN 500 MG/1
500 TABLET, FILM COATED ORAL EVERY 12 HOURS
Qty: 20 TABLET | Refills: 0 | Status: SHIPPED | OUTPATIENT
Start: 2021-12-20 | End: 2021-12-30

## 2021-12-21 LAB
BACTERIA BLD CULT: NORMAL
BACTERIA BLD CULT: NORMAL

## 2022-01-10 ENCOUNTER — APPOINTMENT (OUTPATIENT)
Dept: LAB | Facility: HOSPITAL | Age: 77
End: 2022-01-10
Payer: MEDICARE

## 2022-01-10 DIAGNOSIS — R35.1 NOCTURIA: ICD-10-CM

## 2022-01-10 DIAGNOSIS — M79.10 MYALGIA: ICD-10-CM

## 2022-01-10 DIAGNOSIS — R74.02 NONSPECIFIC ELEVATION OF LEVELS OF TRANSAMINASE OR LACTIC ACID DEHYDROGENASE (LDH): ICD-10-CM

## 2022-01-10 DIAGNOSIS — R53.83 FATIGUE, UNSPECIFIED TYPE: ICD-10-CM

## 2022-01-10 DIAGNOSIS — R60.0 LOCALIZED EDEMA: ICD-10-CM

## 2022-01-10 DIAGNOSIS — R74.01 NONSPECIFIC ELEVATION OF LEVELS OF TRANSAMINASE OR LACTIC ACID DEHYDROGENASE (LDH): ICD-10-CM

## 2022-01-10 LAB
ALBUMIN SERPL BCP-MCNC: 3.4 G/DL (ref 3.5–5)
ALP SERPL-CCNC: 54 U/L (ref 46–116)
ALT SERPL W P-5'-P-CCNC: 53 U/L (ref 12–78)
ANION GAP SERPL CALCULATED.3IONS-SCNC: 7 MMOL/L (ref 4–13)
AST SERPL W P-5'-P-CCNC: 23 U/L (ref 5–45)
BILIRUB SERPL-MCNC: 0.5 MG/DL (ref 0.2–1)
BUN SERPL-MCNC: 23 MG/DL (ref 5–25)
CALCIUM ALBUM COR SERPL-MCNC: 9.4 MG/DL (ref 8.3–10.1)
CALCIUM SERPL-MCNC: 8.9 MG/DL (ref 8.3–10.1)
CHLORIDE SERPL-SCNC: 106 MMOL/L (ref 100–108)
CK SERPL-CCNC: 83 U/L (ref 39–308)
CO2 SERPL-SCNC: 27 MMOL/L (ref 21–32)
CREAT SERPL-MCNC: 1.11 MG/DL (ref 0.6–1.3)
ERYTHROCYTE [DISTWIDTH] IN BLOOD BY AUTOMATED COUNT: 12.3 % (ref 11.6–15.1)
GFR SERPL CREATININE-BSD FRML MDRD: 65 ML/MIN/1.73SQ M
GLUCOSE SERPL-MCNC: 125 MG/DL (ref 65–140)
HCT VFR BLD AUTO: 45.9 % (ref 36.5–49.3)
HGB BLD-MCNC: 15.4 G/DL (ref 12–17)
MCH RBC QN AUTO: 32.6 PG (ref 26.8–34.3)
MCHC RBC AUTO-ENTMCNC: 33.6 G/DL (ref 31.4–37.4)
MCV RBC AUTO: 97 FL (ref 82–98)
PLATELET # BLD AUTO: 212 THOUSANDS/UL (ref 149–390)
PMV BLD AUTO: 9.5 FL (ref 8.9–12.7)
POTASSIUM SERPL-SCNC: 4.4 MMOL/L (ref 3.5–5.3)
PROT SERPL-MCNC: 7.4 G/DL (ref 6.4–8.2)
PSA SERPL-MCNC: 3.8 NG/ML (ref 0–4)
RBC # BLD AUTO: 4.72 MILLION/UL (ref 3.88–5.62)
SODIUM SERPL-SCNC: 140 MMOL/L (ref 136–145)
WBC # BLD AUTO: 4.97 THOUSAND/UL (ref 4.31–10.16)

## 2022-01-10 PROCEDURE — 36415 COLL VENOUS BLD VENIPUNCTURE: CPT

## 2022-01-10 PROCEDURE — 80053 COMPREHEN METABOLIC PANEL: CPT

## 2022-01-10 PROCEDURE — 82550 ASSAY OF CK (CPK): CPT

## 2022-01-10 PROCEDURE — 87086 URINE CULTURE/COLONY COUNT: CPT

## 2022-01-10 PROCEDURE — 85027 COMPLETE CBC AUTOMATED: CPT

## 2022-01-10 PROCEDURE — 84153 ASSAY OF PSA TOTAL: CPT

## 2022-01-11 LAB — BACTERIA UR CULT: NORMAL

## 2022-04-29 ENCOUNTER — HOSPITAL ENCOUNTER (OUTPATIENT)
Dept: ULTRASOUND IMAGING | Facility: HOSPITAL | Age: 77
Discharge: HOME | End: 2022-04-29
Payer: MEDICARE

## 2022-04-29 ENCOUNTER — HOSPITAL ENCOUNTER (OUTPATIENT)
Dept: RADIOLOGY | Facility: HOSPITAL | Age: 77
Discharge: HOME | End: 2022-04-29
Payer: MEDICARE

## 2022-04-29 DIAGNOSIS — R10.10 UPPER ABDOMINAL PAIN: ICD-10-CM

## 2022-04-29 DIAGNOSIS — R05.9 COUGH: ICD-10-CM

## 2022-04-29 PROCEDURE — 76700 US EXAM ABDOM COMPLETE: CPT

## 2022-04-29 PROCEDURE — 71046 X-RAY EXAM CHEST 2 VIEWS: CPT

## 2022-05-16 ENCOUNTER — OFFICE VISIT (OUTPATIENT)
Dept: GASTROENTEROLOGY | Facility: CLINIC | Age: 77
End: 2022-05-16
Payer: MEDICARE

## 2022-05-16 VITALS
SYSTOLIC BLOOD PRESSURE: 155 MMHG | WEIGHT: 177 LBS | HEART RATE: 78 BPM | BODY MASS INDEX: 29.49 KG/M2 | TEMPERATURE: 99.1 F | DIASTOLIC BLOOD PRESSURE: 76 MMHG | HEIGHT: 65 IN

## 2022-05-16 DIAGNOSIS — K76.0 STEATOSIS OF LIVER: Primary | ICD-10-CM

## 2022-05-16 PROCEDURE — 99203 OFFICE O/P NEW LOW 30 MIN: CPT | Performed by: INTERNAL MEDICINE

## 2022-05-16 NOTE — PROGRESS NOTES
[facility] - Outpatient Consultation  Moy Waldron 78 y o  male MRN: [id number]  Encounter: [id number]          ASSESSMENT AND PLAN:      1  Steatosis of liver    This is a 72-year-old white male with steatosis seen on CT  His LFTs are normal and therefore no further treatment or workup is needed at this time  Most likely has benign fatty liver which does not require any treatment  Weight loss however is advisable  Thank you so much for referring this patient     ______________________________________________________________________    HPI:  Aydee Reasons is a 72-year-old white male who is referred because of the presence of fatty liver on CT scan  He is an occasional drinker  He denies any smoking use  His LFTs are normal     His last colonoscopy was about three years ago and was normal       REVIEW OF SYSTEMS:    CONSTITUTIONAL: Denies any fever, chills, rigors, and weight loss  HEENT: No earache or tinnitus  Denies hearing loss or visual disturbances  CARDIOVASCULAR: No chest pain or palpitations  RESPIRATORY: Denies any cough, hemoptysis, shortness of breath or dyspnea on exertion  GASTROINTESTINAL: As noted in the History of Present Illness  GENITOURINARY: No problems with urination  Denies any hematuria or dysuria  NEUROLOGIC: No dizziness or vertigo, denies headaches  MUSCULOSKELETAL: Denies any muscle or joint pain  SKIN: Denies skin rashes or itching  ENDOCRINE: Denies excessive thirst  Denies intolerance to heat or cold  PSYCHOSOCIAL: Denies depression or anxiety  Denies any recent memory loss         Historical Information   Past Medical History:   Diagnosis Date    GERD (gastroesophageal reflux disease)     Hypertension      Past Surgical History:   Procedure Laterality Date    KNEE ARTHROSCOPY       Social History   Social History     Substance and Sexual Activity   Alcohol Use Yes    Comment: 1-2 drinks a month     Social History     Substance and Sexual Activity   Drug Use Never     Social History     Tobacco Use   Smoking Status Never Smoker   Smokeless Tobacco Never Used     Family History   Problem Relation Age of Onset    Hypertension Father     Diabetes Father        Meds/Allergies       Current Outpatient Medications:     amLODIPine (NORVASC) 5 mg tablet    cholecalciferol (VITAMIN D3) 1,000 units tablet    [facility] [place]-3 Krill Oil 300 MG CAPS    gabapentin (NEURONTIN) 100 mg capsule    lisinopril (ZESTRIL) 10 mg tablet    methocarbamol (ROBAXIN) 500 mg tablet    omeprazole (PriLOSEC) 20 mg delayed release capsule    oxyCODONE (ROXICODONE) 5 mg immediate release tablet    polyethylene glycol (MIRALAX) 17 g packet    senna-docusate sodium (SENOKOT S) 8 6-50 mg per tablet    Allergies   Allergen Reactions    Sulfa Antibiotics      As a child           Objective     Blood pressure 155/76, pulse 78, temperature 99 1 °F (37 3 °C), height 5' 5" (1 651 m), weight 80 3 kg (177 lb)  Body mass index is 29 45 kg/m²  PHYSICAL EXAM:      General Appearance:   Alert, cooperative, no distress   HEENT:   Normocephalic, atraumatic, anicteric  Neck:  Supple, symmetrical, trachea midline   Lungs:   Clear to auscultation bilaterally; no rales, rhonchi or wheezing; respirations unlabored    Heart[de-identified]   Regular rate and rhythm; no murmur, rub, or gallop  Abdomen:   Soft, non-tender, non-distended; normal bowel sounds; no masses, no organomegaly    Genitalia:   Deferred    Rectal:   Deferred    Extremities:  No cyanosis, clubbing or edema    Pulses:  2+ and symmetric    Skin:  No jaundice, rashes, or lesions    Lymph nodes:  No palpable cervical lymphadenopathy        Lab Results:   No visits with results within 1 Day(s) from this visit     Latest known visit with results is:   Appointment on 01/10/2022   Component Date Value    WBC 01/10/2022 4 97     RBC 01/10/2022 4 72     Hemoglobin 01/10/2022 15 4     Hematocrit 01/10/2022 45 9     MCV 01/10/2022 97  MCH 01/10/2022 32 6     MCHC 01/10/2022 33 6     RDW 01/10/2022 12 3     Platelets 39/04/0846 212     MPV 01/10/2022 9 5     Sodium 01/10/2022 140     Potassium 01/10/2022 4 4     Chloride 01/10/2022 106     CO2 01/10/2022 27     ANION GAP 01/10/2022 7     BUN 01/10/2022 23     Creatinine 01/10/2022 1 11     Glucose 01/10/2022 125     Calcium 01/10/2022 8 9     Corrected Calcium 01/10/2022 9 4     AST 01/10/2022 23     ALT 01/10/2022 53     Alkaline Phosphatase 01/10/2022 54     Total Protein 01/10/2022 7 4     Albumin 01/10/2022 3 4 (A)    Total Bilirubin 01/10/2022 0 50     eGFR 01/10/2022 65     Total CK 01/10/2022 83     Urine Culture 01/10/2022 No Growth <100 cfu/mL     PSA, Diagnostic 01/10/2022 3 8          Radiology Results:   XR chest pa & lateral    Result Date: 5/1/2022  Narrative: CHEST INDICATION:   R05 9: Cough, unspecified  COMPARISON:  Chest radiograph and CT from 12/16/2021  EXAM PERFORMED/VIEWS:  XR CHEST PA & LATERAL FINDINGS: Cardiomediastinal silhouette appears unremarkable  The lungs are clear  No pneumothorax or pleural effusion  Osseous structures appear within normal limits for patient age  Impression: No acute cardiopulmonary disease  Workstation performed: [id number]     US abdomen complete    Result Date: 5/3/2022  Narrative: ABDOMEN ULTRASOUND, COMPLETE INDICATION:   R10 10: Upper abdominal pain, unspecified  COMPARISON:  CT 12/16/2021 TECHNIQUE:   Real-time ultrasound of the abdomen was performed with a curvilinear transducer with both volumetric sweeps and still imaging techniques  FINDINGS: PANCREAS: Partial obscuration by bowel gas and body habitus  Visualized portions of the pancreas are within normal limits  AORTA AND IVC: Partial obscuration  Visualized portions are normal for patient age  LIVER: Size:  Enlarged  The liver measures 17 6 cm in the midclavicular line  Contour:  Surface contour is smooth   Parenchyma:  Increased echogenicity, posterior acoustic attenuation, decreased periportal echogenicity  Gallbladder fossa adjacent hypoechogenicity    No liver mass identified  Limited imaging of the main portal vein shows it to be patent and hepatopetal  BILIARY: No gallbladder findings  No intrahepatic biliary dilatation  CBD measures 4 0 mm  No choledocholithiasis  Negative Jarvis's sign KIDNEY: Right kidney measures 11 0 x 4 8 x 5 1  cm  Volume 139 3 mL Kidney within normal limits  Left kidney measures 11 8 x 5 8 x 4 3 cm  Volume 153 9 mL Upper pole 1 4 cm partially exophytic cyst again noted  SPLEEN: Measures 8 4 x 8 9 x 4 2 cm  Volume 162 7 mL Within normal limits  ASCITES:  None  Impression: Hepatomegaly  Hepatic moderate steatosis  Focal fatty sparing   Workstation performed: [id number]

## 2022-07-30 ENCOUNTER — APPOINTMENT (OUTPATIENT)
Dept: LAB | Facility: HOSPITAL | Age: 77
End: 2022-07-30
Payer: MEDICARE

## 2022-07-30 DIAGNOSIS — M79.10 MYALGIA: ICD-10-CM

## 2022-07-30 DIAGNOSIS — E78.5 HYPERLIPIDEMIA, UNSPECIFIED HYPERLIPIDEMIA TYPE: ICD-10-CM

## 2022-07-30 DIAGNOSIS — Z79.899 ENCOUNTER FOR LONG-TERM (CURRENT) USE OF OTHER MEDICATIONS: ICD-10-CM

## 2022-07-30 DIAGNOSIS — E55.9 VITAMIN D DEFICIENCY DISEASE: ICD-10-CM

## 2022-07-30 DIAGNOSIS — M25.50 ARTHRALGIA, UNSPECIFIED JOINT: ICD-10-CM

## 2022-07-30 DIAGNOSIS — I10 HYPERTENSION, UNSPECIFIED TYPE: ICD-10-CM

## 2022-07-30 LAB
25(OH)D3 SERPL-MCNC: 31.9 NG/ML (ref 30–100)
ALBUMIN SERPL BCP-MCNC: 3.2 G/DL (ref 3.5–5)
ALP SERPL-CCNC: 47 U/L (ref 46–116)
ALT SERPL W P-5'-P-CCNC: 52 U/L (ref 12–78)
ANION GAP SERPL CALCULATED.3IONS-SCNC: 7 MMOL/L (ref 4–13)
AST SERPL W P-5'-P-CCNC: 23 U/L (ref 5–45)
BILIRUB SERPL-MCNC: 0.55 MG/DL (ref 0.2–1)
BUN SERPL-MCNC: 19 MG/DL (ref 5–25)
CALCIUM ALBUM COR SERPL-MCNC: 9.1 MG/DL (ref 8.3–10.1)
CALCIUM SERPL-MCNC: 8.5 MG/DL (ref 8.3–10.1)
CHLORIDE SERPL-SCNC: 104 MMOL/L (ref 96–108)
CHOLEST SERPL-MCNC: 201 MG/DL
CO2 SERPL-SCNC: 27 MMOL/L (ref 21–32)
CREAT SERPL-MCNC: 1.14 MG/DL (ref 0.6–1.3)
CRP SERPL QL: <0.5 MG/L
ERYTHROCYTE [DISTWIDTH] IN BLOOD BY AUTOMATED COUNT: 12.5 % (ref 11.6–15.1)
ERYTHROCYTE [SEDIMENTATION RATE] IN BLOOD: 22 MM/HOUR (ref 0–19)
GFR SERPL CREATININE-BSD FRML MDRD: 63 ML/MIN/1.73SQ M
GLUCOSE P FAST SERPL-MCNC: 122 MG/DL (ref 65–99)
HCT VFR BLD AUTO: 43.2 % (ref 36.5–49.3)
HDLC SERPL-MCNC: 35 MG/DL
HGB BLD-MCNC: 14.8 G/DL (ref 12–17)
MCH RBC QN AUTO: 33.2 PG (ref 26.8–34.3)
MCHC RBC AUTO-ENTMCNC: 34.3 G/DL (ref 31.4–37.4)
MCV RBC AUTO: 97 FL (ref 82–98)
NONHDLC SERPL-MCNC: 166 MG/DL
PLATELET # BLD AUTO: 209 THOUSANDS/UL (ref 149–390)
PMV BLD AUTO: 9.6 FL (ref 8.9–12.7)
POTASSIUM SERPL-SCNC: 4 MMOL/L (ref 3.5–5.3)
PROT SERPL-MCNC: 7.1 G/DL (ref 6.4–8.4)
RBC # BLD AUTO: 4.46 MILLION/UL (ref 3.88–5.62)
SODIUM SERPL-SCNC: 138 MMOL/L (ref 135–147)
TRIGL SERPL-MCNC: 505 MG/DL
TSH SERPL DL<=0.05 MIU/L-ACNC: 3.22 UIU/ML (ref 0.45–4.5)
WBC # BLD AUTO: 5.11 THOUSAND/UL (ref 4.31–10.16)

## 2022-07-30 PROCEDURE — 85652 RBC SED RATE AUTOMATED: CPT

## 2022-07-30 PROCEDURE — 84443 ASSAY THYROID STIM HORMONE: CPT

## 2022-07-30 PROCEDURE — 85027 COMPLETE CBC AUTOMATED: CPT

## 2022-07-30 PROCEDURE — 86618 LYME DISEASE ANTIBODY: CPT

## 2022-07-30 PROCEDURE — 36415 COLL VENOUS BLD VENIPUNCTURE: CPT

## 2022-07-30 PROCEDURE — 82306 VITAMIN D 25 HYDROXY: CPT

## 2022-07-30 PROCEDURE — 83036 HEMOGLOBIN GLYCOSYLATED A1C: CPT

## 2022-07-30 PROCEDURE — 86140 C-REACTIVE PROTEIN: CPT

## 2022-07-30 PROCEDURE — 86617 LYME DISEASE ANTIBODY: CPT

## 2022-07-30 PROCEDURE — 80053 COMPREHEN METABOLIC PANEL: CPT

## 2022-07-30 PROCEDURE — 80061 LIPID PANEL: CPT

## 2022-07-31 LAB — B BURGDOR IGG+IGM SER-ACNC: >8 AI

## 2022-08-01 LAB
B BURGDOR IGG PATRN SER IB-IMP: POSITIVE
B BURGDOR IGM PATRN SER IB-IMP: NEGATIVE
B BURGDOR18KD IGG SER QL IB: PRESENT
B BURGDOR23KD IGG SER QL IB: PRESENT
B BURGDOR23KD IGM SER QL IB: ABNORMAL
B BURGDOR28KD IGG SER QL IB: PRESENT
B BURGDOR30KD IGG SER QL IB: PRESENT
B BURGDOR39KD IGG SER QL IB: PRESENT
B BURGDOR39KD IGM SER QL IB: ABNORMAL
B BURGDOR41KD IGG SER QL IB: PRESENT
B BURGDOR41KD IGM SER QL IB: ABNORMAL
B BURGDOR45KD IGG SER QL IB: PRESENT
B BURGDOR58KD IGG SER QL IB: PRESENT
B BURGDOR66KD IGG SER QL IB: PRESENT
B BURGDOR93KD IGG SER QL IB: PRESENT

## 2022-08-02 LAB
EST. AVERAGE GLUCOSE BLD GHB EST-MCNC: 117 MG/DL
HBA1C MFR BLD: 5.7 %

## 2023-01-27 ENCOUNTER — APPOINTMENT (EMERGENCY)
Dept: RADIOLOGY | Facility: HOSPITAL | Age: 78
End: 2023-01-27

## 2023-01-27 ENCOUNTER — HOSPITAL ENCOUNTER (EMERGENCY)
Facility: HOSPITAL | Age: 78
Discharge: HOME | End: 2023-01-27
Attending: EMERGENCY MEDICINE

## 2023-01-27 VITALS
OXYGEN SATURATION: 95 % | RESPIRATION RATE: 18 BRPM | SYSTOLIC BLOOD PRESSURE: 170 MMHG | TEMPERATURE: 98 F | HEART RATE: 60 BPM | DIASTOLIC BLOOD PRESSURE: 70 MMHG

## 2023-01-27 DIAGNOSIS — R07.89 ATYPICAL CHEST PAIN: Primary | ICD-10-CM

## 2023-01-27 DIAGNOSIS — I10 HYPERTENSION: ICD-10-CM

## 2023-01-27 LAB
2HR DELTA HS TROPONIN: 1 NG/L
ALBUMIN SERPL BCP-MCNC: 4 G/DL (ref 3.5–5)
ALP SERPL-CCNC: 43 U/L (ref 34–104)
ALT SERPL W P-5'-P-CCNC: 46 U/L (ref 7–52)
ANION GAP SERPL CALCULATED.3IONS-SCNC: 8 MMOL/L (ref 4–13)
AST SERPL W P-5'-P-CCNC: 30 U/L (ref 13–39)
ATRIAL RATE: 70 BPM
BASOPHILS # BLD AUTO: 0.02 THOUSANDS/ÂΜL (ref 0–0.1)
BASOPHILS NFR BLD AUTO: 0 % (ref 0–1)
BILIRUB SERPL-MCNC: 0.51 MG/DL (ref 0.2–1)
BUN SERPL-MCNC: 25 MG/DL (ref 5–25)
CALCIUM SERPL-MCNC: 9 MG/DL (ref 8.4–10.2)
CARDIAC TROPONIN I PNL SERPL HS: 7 NG/L
CARDIAC TROPONIN I PNL SERPL HS: 8 NG/L
CHLORIDE SERPL-SCNC: 108 MMOL/L (ref 96–108)
CO2 SERPL-SCNC: 24 MMOL/L (ref 21–32)
CREAT SERPL-MCNC: 1.05 MG/DL (ref 0.6–1.3)
D DIMER PPP FEU-MCNC: 0.74 UG/ML FEU
EOSINOPHIL # BLD AUTO: 0.1 THOUSAND/ÂΜL (ref 0–0.61)
EOSINOPHIL NFR BLD AUTO: 2 % (ref 0–6)
ERYTHROCYTE [DISTWIDTH] IN BLOOD BY AUTOMATED COUNT: 12.3 % (ref 11.6–15.1)
FLUAV RNA RESP QL NAA+PROBE: NEGATIVE
FLUBV RNA RESP QL NAA+PROBE: NEGATIVE
GFR SERPL CREATININE-BSD FRML MDRD: 69 ML/MIN/1.73SQ M
GLUCOSE SERPL-MCNC: 107 MG/DL (ref 65–140)
HCT VFR BLD AUTO: 43.9 % (ref 36.5–49.3)
HGB BLD-MCNC: 15 G/DL (ref 12–17)
IMM GRANULOCYTES # BLD AUTO: 0.01 THOUSAND/UL (ref 0–0.2)
IMM GRANULOCYTES NFR BLD AUTO: 0 % (ref 0–2)
LYMPHOCYTES # BLD AUTO: 1.34 THOUSANDS/ÂΜL (ref 0.6–4.47)
LYMPHOCYTES NFR BLD AUTO: 26 % (ref 14–44)
MCH RBC QN AUTO: 33.1 PG (ref 26.8–34.3)
MCHC RBC AUTO-ENTMCNC: 34.2 G/DL (ref 31.4–37.4)
MCV RBC AUTO: 97 FL (ref 82–98)
MONOCYTES # BLD AUTO: 0.51 THOUSAND/ÂΜL (ref 0.17–1.22)
MONOCYTES NFR BLD AUTO: 10 % (ref 4–12)
NEUTROPHILS # BLD AUTO: 3.27 THOUSANDS/ÂΜL (ref 1.85–7.62)
NEUTS SEG NFR BLD AUTO: 62 % (ref 43–75)
NRBC BLD AUTO-RTO: 0 /100 WBCS
P AXIS: 59 DEGREES
PLATELET # BLD AUTO: 234 THOUSANDS/UL (ref 149–390)
PMV BLD AUTO: 9.8 FL (ref 8.9–12.7)
POTASSIUM SERPL-SCNC: 4.2 MMOL/L (ref 3.5–5.3)
PR INTERVAL: 162 MS
PROT SERPL-MCNC: 7 G/DL (ref 6.4–8.4)
QRS AXIS: 31 DEGREES
QRSD INTERVAL: 80 MS
QT INTERVAL: 424 MS
QTC INTERVAL: 457 MS
RBC # BLD AUTO: 4.53 MILLION/UL (ref 3.88–5.62)
RSV RNA RESP QL NAA+PROBE: NEGATIVE
SARS-COV-2 RNA RESP QL NAA+PROBE: NEGATIVE
SODIUM SERPL-SCNC: 140 MMOL/L (ref 135–147)
T WAVE AXIS: 36 DEGREES
VENTRICULAR RATE: 70 BPM
WBC # BLD AUTO: 5.25 THOUSAND/UL (ref 4.31–10.16)

## 2023-01-27 RX ORDER — KETOROLAC TROMETHAMINE 30 MG/ML
15 INJECTION, SOLUTION INTRAMUSCULAR; INTRAVENOUS ONCE
Status: COMPLETED | OUTPATIENT
Start: 2023-01-27 | End: 2023-01-27

## 2023-01-27 RX ADMIN — KETOROLAC TROMETHAMINE 15 MG: 30 INJECTION, SOLUTION INTRAMUSCULAR at 17:19

## 2023-01-27 NOTE — ED PROVIDER NOTES
EMERGENCY DEPARTMENT ENCOUNTER NOTE    This note has been generated using a voice recognition software  There may be typographic, grammatic, or word substitution errors that have escaped editorial review  Emergency Department Note- Lani Mathew 78 y o  male MRN: [id number]    Unit/Bed#: [facility] Encounter: [id number]  ? CHIEF COMPLAINT  Chief Complaint   Patient presents with   • Chest Pain     Patient states mid sternal chest pain for a few weeks       HPI  Lani Mathew is a 78 y o  male with PMH of hypertension, acid reflux, presenting with midsternal chest pain  Patient has had symptoms over the past several weeks, on and off, located in his central substernal chest that feels like a pressure  There is no radiation through to the back or up the neck  No associated symptoms such as nausea, vomiting, abdominal pain, or diarrhea  No fevers  No cough  Patient does notice that the pain is worse when he takes a deep breath in and out  He has no history of DVT or PE  He has no history of coronary artery disease  He has not noticed any increase in chest discomfort or shortness of breath with exertion  He did take an at home COVID test but it was negative  He has not had any fevers, runny nose, or sore throat      REVIEW OF SYSTEMS    Constitutional: denies fevers, chills  Visual/Eyes: no changes in vision  HENT: no rhinorrhea, no sore throat  Cardiac: Chest pain, as above  Respiratory: no shortness of breath, no cough  GI: no abdominal pain, nausea, vomiting, or diarrhea  : no burning with urination  Heme/Onc: no easy bruising  Endocrine: no diabetes  Neuro: no focal weakness or numbness, no headaches    Ten systems reviewed and negative unless otherwise noted in HPI and above    PAST MEDICAL HISTORY  Past Medical History:   Diagnosis Date   • GERD (gastroesophageal reflux disease)    • Hypertension        SURGICAL HISTORY  Past Surgical History:   Procedure Laterality Date   • KNEE ARTHROSCOPY FAMILY HISTORY  Family History   Problem Relation Age of Onset   • Hypertension Father    • Diabetes Father         CURRENT MEDICATIONS  No current facility-administered medications on file prior to encounter       Current Outpatient Medications on File Prior to Encounter   Medication Sig   • cholecalciferol (VITAMIN D3) 1,000 units tablet Take 2,000 Units by mouth daily   • omeprazole (PriLOSEC) 20 mg delayed release capsule Take 20 mg by mouth daily   • amLODIPine (NORVASC) 5 mg tablet Take 5 mg by mouth daily (Patient not taking: Reported on 7/6/2021)   • [employer] [place]-3 Krill Oil 300 MG CAPS Take by mouth   • gabapentin (NEURONTIN) 100 mg capsule Take 1 capsule (100 mg total) by mouth daily at bedtime for 7 days   • lisinopril (ZESTRIL) 10 mg tablet Take 20 mg by mouth daily    • methocarbamol (ROBAXIN) 500 mg tablet Take 1 tablet (500 mg total) by mouth every 8 (eight) hours for 7 days   • oxyCODONE (ROXICODONE) 5 mg immediate release tablet Take 0 5-1 tablets (2 5-5 mg total) by mouth every 4 (four) hours as needed for moderate pain or severe painMax Daily Amount: 30 mg (Patient not taking: Reported on 7/6/2021)   • polyethylene glycol (MIRALAX) 17 g packet Take 17 g by mouth daily as needed (Constipation) for up to 7 days   • senna-docusate sodium (SENOKOT S) 8 6-50 mg per tablet Take 2 tablets by mouth daily for 7 days       ALLERGIES  Allergies   Allergen Reactions   • Sulfa Antibiotics      As a child       SOCIAL HISTORY  Social History     Socioeconomic History   • Marital status: Single     Spouse name: None   • Number of children: None   • Years of education: None   • Highest education level: None   Occupational History   • None   Tobacco Use   • Smoking status: Never   • Smokeless tobacco: Never   Vaping Use   • Vaping Use: Never used   Substance and Sexual Activity   • Alcohol use: Yes     Comment: 1-2 drinks a month   • Drug use: Never   • Sexual activity: Not Currently   Other Topics Concern   • None   Social History Narrative    Caffeine use- coffee on occasion     Social Determinants of Health     Financial Resource Strain: Not on file   Food Insecurity: Not on file   Transportation Needs: Not on file   Physical Activity: Not on file   Stress: Not on file   Social Connections: Not on file   Intimate Partner Violence: Not on file   Housing Stability: Not on file       PHYSICAL EXAM    /77 (BP Location: Right arm)   Pulse 78   Temp 98 1 °F (36 7 °C) (Tympanic)   Resp 18   SpO2 96%   Vital signs and nursing notes reviewed    CONSTITUTIONAL: male appearing stated age resting in bed, in no acute distress  HEENT: atraumatic, normocephalic  Sclera anicteric, conjunctiva are not injected  Moist oral mucosa  CARDIOVASCULAR/CHEST: RRR, no M/R/G  2+ radial pulses  PULMONARY: Breathing comfortably on RA  Breath sounds are equal and clear to auscultation  ABDOMEN: non-distended  BS present, normoactive  Non-tender  MSK: moves all extremities, no deformities, no peripheral edema, no calf asymmetry  NEURO: Awake, alert, and oriented x 3  Face symmetric  Moves all extremities spontaneously  No focal neurologic deficits  SKIN: Warm, appears well-perfused  MENTAL STATUS: Normal affect      ? LABS AND TESTS    Results Reviewed     Procedure Component Value Units Date/Time    COVID19, Influenza A/B, RSV PCR, [facility] [[id number]]  (Normal) Collected: 01/27/23 1603    Lab Status: Final result Specimen: Nares from Nose Updated: 01/27/23 1700     SARS-CoV-2 Negative     INFLUENZA A PCR Negative     INFLUENZA B PCR Negative     RSV PCR Negative    Narrative:      FOR PEDIATRIC PATIENTS - copy/paste COVID Guidelines URL to browser: [email or web address]  [employer]    SARS-CoV-2 assay is a Nucleic Acid Amplification assay intended for the  qualitative detection of nucleic acid from SARS-CoV-2 in nasopharyngeal  swabs   Results are for the presumptive identification of SARS-CoV-2 RNA     Positive results are indicative of infection with SARS-CoV-2, the virus  causing COVID-19, but do not rule out bacterial infection or co-infection  with other viruses  Laboratories within the [place] and its  territories are required to report all positive results to the appropriate  public health authorities  Negative results do not preclude SARS-CoV-2  infection and should not be used as the sole basis for treatment or other  patient management decisions  Negative results must be combined with  clinical observations, patient history, and epidemiological information  This test has not been FDA cleared or approved  This test has been authorized by FDA under an Emergency Use Authorization  (EUA)  This test is only authorized for the duration of time the  declaration that circumstances exist justifying the authorization of the  emergency use of an in vitro diagnostic tests for detection of SARS-CoV-2  virus and/or diagnosis of COVID-19 infection under section 564(b)(1) of  the Act, 21 U  S C  149TTF-0(T)(6), unless the authorization is terminated  or revoked sooner  The test has been validated but independent review by FDA  and CLIA is pending  Test performed using [employer] GeneXpert: This RT-PCR assay targets N2,  a region unique to SARS-CoV-2  A conserved region in the E-gene was chosen  for pan-Sarbecovirus detection which includes SARS-CoV-2  According to CMS-2020-01-R, this platform meets the definition of high-throughput technology      HS Troponin 0hr (reflex protocol) [[id number]]  (Normal) Collected: 01/27/23 1603    Lab Status: Final result Specimen: Blood from Arm, Right Updated: 01/27/23 1642     hs TnI 0hr 7 ng/L     HS Troponin I 4hr [[id number]]     Lab Status: No result Specimen: Blood     HS Troponin I 2hr [[id number]]     Lab Status: No result Specimen: Blood     Comprehensive metabolic panel [[id number]] Collected: 01/27/23 1603    Lab Status: Final result Specimen: Blood from Arm, Right Updated: 01/27/23 1640     Sodium 140 mmol/L      Potassium 4 2 mmol/L      Chloride 108 mmol/L      CO2 24 mmol/L      ANION GAP 8 mmol/L      BUN 25 mg/dL      Creatinine 1 05 mg/dL      Glucose 107 mg/dL      Calcium 9 0 mg/dL      AST 30 U/L      ALT 46 U/L      Alkaline Phosphatase 43 U/L      Total Protein 7 0 g/dL      Albumin 4 0 g/dL      Total Bilirubin 0 51 mg/dL      eGFR 69 ml/min/1 73sq m     Narrative:      [place] guidelines for Chronic Kidney Disease (CKD):   •  Stage 1 with normal or high GFR (GFR > 90 mL/min/1 73 square meters)  •  Stage 2 Mild CKD (GFR = 60-89 mL/min/1 73 square meters)  •  Stage 3A Moderate CKD (GFR = 45-59 mL/min/1 73 square meters)  •  Stage 3B Moderate CKD (GFR = 30-44 mL/min/1 73 square meters)  •  Stage 4 Severe CKD (GFR = 15-29 mL/min/1 73 square meters)  •  Stage 5 End Stage CKD (GFR <15 mL/min/1 73 square meters)  Note: GFR calculation is accurate only with a steady state creatinine    D-Dimer [[id number]]  (Abnormal) Collected: 01/27/23 1603    Lab Status: Final result Specimen: Blood from Arm, Right Updated: 01/27/23 1637     D-Dimer, Quant 0 74 ug/ml FEU     Narrative: In the evaluation for possible pulmonary embolism, in the appropriate (Well's Score of 4 or less) patient, the age adjusted d-dimer cutoff for this patient can be calculated as:    Age x 0 01 (in ug/mL) for Age-adjusted D-dimer exclusion threshold for a patient over 50 years      CBC and differential [[id number]] Collected: 01/27/23 1603    Lab Status: Final result Specimen: Blood from Arm, Right Updated: 01/27/23 1619     WBC 5 25 Thousand/uL      RBC 4 53 Million/uL      Hemoglobin 15 0 g/dL      Hematocrit 43 9 %      MCV 97 fL      MCH 33 1 pg      MCHC 34 2 g/dL      RDW 12 3 %      MPV 9 8 fL      Platelets 208 Thousands/uL      nRBC 0 /100 WBCs      Neutrophils Relative 62 %      Immat GRANS % 0 %      Lymphocytes Relative 26 %      Monocytes Relative 10 % Eosinophils Relative 2 %      Basophils Relative 0 %      Neutrophils Absolute 3 27 Thousands/µL      Immature Grans Absolute 0 01 Thousand/uL      Lymphocytes Absolute 1 34 Thousands/µL      Monocytes Absolute 0 51 Thousand/µL      Eosinophils Absolute 0 10 Thousand/µL      Basophils Absolute 0 02 Thousands/µL           XR chest 2 views   Final Result by Leopoldo Jenkins MD (01/28 4280)      No acute cardiopulmonary disease  Workstation performed: [id number]             ED COURSE & MEDICAL DECISION MAKING  Procedures                     Medications   ketorolac (TORADOL) injection 15 mg (15 mg Intravenous Given 1/27/23 1719)         ED Course as of 01/28/23 2049 Fri Jan 27, 2023 1822 Patient updated  Chest pain is possibly better after Toradol although some is still present  We are awaiting repeat troponin  D-dimer is normal for patient's age, no indication for CT chest angiography at this time  1850 Chest x-ray to my review is without infiltrates, without cardiomegaly, without pneumothorax  Formal read is pending         MDM    CLINICAL IMPRESSION  Final diagnoses:   None       DISPOSITION  ED Disposition     None      Follow-up Information    None         DISCHARGE MEDICATIONS  Patient's Medications   Discharge Prescriptions    No medications on file        Sahara Jovel MD  01/28/23 2050 1)     78 y o  male presenting with chest pain  Vital Signs reviewed  Differential diagnosis includes acute coronary syndrome, pericarditis, myocarditis, PE, pneumonia, pneumothorax, dissection, pleurisy, musculoskeletal pain, versus another etiology of symptoms  ED Course as of 01/28/23 2049 Fri Jan 27, 2023   5467 Patient updated  Chest pain is possibly better after Toradol although some is still present  We are awaiting repeat troponin  D-dimer is normal for patient's age, no indication for CT chest angiography at this time  1850 Chest x-ray to my review is without infiltrates, without cardiomegaly, without pneumothorax  Formal read is pending  History, physical exam and data collected so far do not support ACS, PE, pneumonia  Recommend Tylenol for chest discomfort  Given age and history of hypertension, recommend follow-up with cardiology for possible a evaluation of underlying cardiac disease  Recommend follow-up with primary care physician  Patient discharged to home with recommendations for symptom control, return precautions, and plan for follow up        MDM  Number of Diagnoses or Management Options  Atypical chest pain: new and requires workup     Amount and/or Complexity of Data Reviewed  Clinical lab tests: ordered and reviewed  Tests in the radiology section of CPT®: ordered and reviewed  Tests in the medicine section of CPT®: ordered and reviewed  Review and summarize past medical records: yes  Independent visualization of images, tracings, or specimens: yes    Patient Progress  Patient progress: improved      CLINICAL IMPRESSION  Final diagnoses:   Atypical chest pain   Hypertension       DISPOSITION  Time reflects when diagnosis was documented in both MDM as applicable and the Disposition within this note     Time User Action Codes Description Comment    1/27/2023  5:17 PM Tomasa Mckee Add [R07 89] Atypical chest pain     1/27/2023  5:17 PM Óscar Rose 116 Hypertension       ED Disposition     ED Disposition   Discharge    Condition   Stable    Date/Time   Fri Jan 27, 2023  5:17 PM    Comment   Chemo Olivas discharge to home/self care                 Follow-up Information     Follow up With Specialties Details Why Contact Info Additional Information    [facility]iology Call in 3 days Emergency Room Follow-up [facility] [phone]  [facility] [place], [place], [place], [address] [facility] Emergency Department Emergency Medicine Go to  As needed, If symptoms worsen [phone]  [facility] Emergency Department, [address], Caitie Clark MD  02/08/23 6632

## 2023-01-27 NOTE — ED PROVIDER NOTES
EMERGENCY DEPARTMENT ENCOUNTER NOTE    This note has been generated using a voice recognition software  There may be typographic, grammatic, or word substitution errors that have escaped editorial review  Emergency Department Note- Nikki Martin 78 y o  male MRN: [id number]    Unit/Bed#: Shannan Lynch Encounter: [id number]  ? CHIEF COMPLAINT  Chief Complaint   Patient presents with   • Chest Pain     Patient states mid sternal chest pain for a few weeks       HPI  Nikki Martin is a 78 y o  male with PMH of hypertension, acid reflux, presenting with midsternal chest pain  Patient has had symptoms over the past several weeks, on and off, located in his central substernal chest that feels like a pressure  There is no radiation through to the back or up the neck  No associated symptoms such as nausea, vomiting, abdominal pain, or diarrhea  No fevers  No cough  Patient does notice that the pain is worse when he takes a deep breath in and out  He has no history of DVT or PE  He has no history of coronary artery disease  He has not noticed any increase in chest discomfort or shortness of breath with exertion  He did take an at home COVID test but it was negative  He has not had any fevers, runny nose, or sore throat      REVIEW OF SYSTEMS    Constitutional: denies fevers, chills  Visual/Eyes: no changes in vision  HENT: no rhinorrhea, no sore throat  Cardiac: Chest pain, as above  Respiratory: no shortness of breath, no cough  GI: no abdominal pain, nausea, vomiting, or diarrhea  : no burning with urination  Heme/Onc: no easy bruising  Endocrine: no diabetes  Neuro: no focal weakness or numbness, no headaches    Ten systems reviewed and negative unless otherwise noted in HPI and above    PAST MEDICAL HISTORY  Past Medical History:   Diagnosis Date   • GERD (gastroesophageal reflux disease)    • Hypertension        SURGICAL HISTORY  Past Surgical History:   Procedure Laterality Date   • KNEE ARTHROSCOPY FAMILY HISTORY  Family History   Problem Relation Age of Onset   • Hypertension Father    • Diabetes Father         CURRENT MEDICATIONS  No current facility-administered medications on file prior to encounter       Current Outpatient Medications on File Prior to Encounter   Medication Sig   • cholecalciferol (VITAMIN D3) 1,000 units tablet Take 2,000 Units by mouth daily   • omeprazole (PriLOSEC) 20 mg delayed release capsule Take 20 mg by mouth daily   • amLODIPine (NORVASC) 5 mg tablet Take 5 mg by mouth daily (Patient not taking: Reported on 7/6/2021)   • [employer] Wing-3 Krill Oil 300 MG CAPS Take by mouth   • gabapentin (NEURONTIN) 100 mg capsule Take 1 capsule (100 mg total) by mouth daily at bedtime for 7 days   • lisinopril (ZESTRIL) 10 mg tablet Take 20 mg by mouth daily    • methocarbamol (ROBAXIN) 500 mg tablet Take 1 tablet (500 mg total) by mouth every 8 (eight) hours for 7 days   • oxyCODONE (ROXICODONE) 5 mg immediate release tablet Take 0 5-1 tablets (2 5-5 mg total) by mouth every 4 (four) hours as needed for moderate pain or severe painMax Daily Amount: 30 mg (Patient not taking: Reported on 7/6/2021)   • polyethylene glycol (MIRALAX) 17 g packet Take 17 g by mouth daily as needed (Constipation) for up to 7 days   • senna-docusate sodium (SENOKOT S) 8 6-50 mg per tablet Take 2 tablets by mouth daily for 7 days       ALLERGIES  Allergies   Allergen Reactions   • Sulfa Antibiotics      As a child       SOCIAL HISTORY  Social History     Socioeconomic History   • Marital status: Single     Spouse name: None   • Number of children: None   • Years of education: None   • Highest education level: None   Occupational History   • None   Tobacco Use   • Smoking status: Never   • Smokeless tobacco: Never   Vaping Use   • Vaping Use: Never used   Substance and Sexual Activity   • Alcohol use: Yes     Comment: 1-2 drinks a month   • Drug use: Never   • Sexual activity: Not Currently   Other Topics Concern   • None   Social History Narrative    Caffeine use- coffee on occasion     Social Determinants of Health     Financial Resource Strain: Not on file   Food Insecurity: Not on file   Transportation Needs: Not on file   Physical Activity: Not on file   Stress: Not on file   Social Connections: Not on file   Intimate Partner Violence: Not on file   Housing Stability: Not on file       PHYSICAL EXAM    /77 (BP Location: Right arm)   Pulse 78   Temp 98 1 °F (36 7 °C) (Tympanic)   Resp 18   SpO2 96%   Vital signs and nursing notes reviewed    CONSTITUTIONAL: male appearing stated age resting in bed, in no acute distress  HEENT: atraumatic, normocephalic  Sclera anicteric, conjunctiva are not injected  Moist oral mucosa  CARDIOVASCULAR/CHEST: RRR, no M/R/G  2+ radial pulses  PULMONARY: Breathing comfortably on RA  Breath sounds are equal and clear to auscultation  ABDOMEN: non-distended  BS present, normoactive  Non-tender  MSK: moves all extremities, no deformities, no peripheral edema, no calf asymmetry  NEURO: Awake, alert, and oriented x 3  Face symmetric  Moves all extremities spontaneously  No focal neurologic deficits  SKIN: Warm, appears well-perfused  MENTAL STATUS: Normal affect      ? LABS AND TESTS    Results Reviewed     Procedure Component Value Units Date/Time    COVID19, Influenza A/B, RSV PCR, [facility] [[id number]]  (Normal) Collected: 01/27/23 1603    Lab Status: Final result Specimen: Nares from Nose Updated: 01/27/23 1700     SARS-CoV-2 Negative     INFLUENZA A PCR Negative     INFLUENZA B PCR Negative     RSV PCR Negative    Narrative:      FOR PEDIATRIC PATIENTS - copy/paste COVID Guidelines URL to browser: [email or web address]  [employer]    SARS-CoV-2 assay is a Nucleic Acid Amplification assay intended for the  qualitative detection of nucleic acid from SARS-CoV-2 in nasopharyngeal  swabs   Results are for the presumptive identification of SARS-CoV-2 RNA     Positive results are indicative of infection with SARS-CoV-2, the virus  causing COVID-19, but do not rule out bacterial infection or co-infection  with other viruses  Laboratories within the [place] and its  territories are required to report all positive results to the appropriate  public health authorities  Negative results do not preclude SARS-CoV-2  infection and should not be used as the sole basis for treatment or other  patient management decisions  Negative results must be combined with  clinical observations, patient history, and epidemiological information  This test has not been FDA cleared or approved  This test has been authorized by FDA under an Emergency Use Authorization  (EUA)  This test is only authorized for the duration of time the  declaration that circumstances exist justifying the authorization of the  emergency use of an in vitro diagnostic tests for detection of SARS-CoV-2  virus and/or diagnosis of COVID-19 infection under section 564(b)(1) of  the Act, 21 U  S C  152GWX-7(J)(5), unless the authorization is terminated  or revoked sooner  The test has been validated but independent review by FDA  and CLIA is pending  Test performed using [employer] GeneXpert: This RT-PCR assay targets N2,  a region unique to SARS-CoV-2  A conserved region in the E-gene was chosen  for pan-Sarbecovirus detection which includes SARS-CoV-2  According to CMS-2020-01-R, this platform meets the definition of high-throughput technology      HS Troponin 0hr (reflex protocol) [[id number]]  (Normal) Collected: 01/27/23 1603    Lab Status: Final result Specimen: Blood from Arm, Right Updated: 01/27/23 1642     hs TnI 0hr 7 ng/L     HS Troponin I 4hr [[id number]]     Lab Status: No result Specimen: Blood     HS Troponin I 2hr [[id number]]     Lab Status: No result Specimen: Blood     Comprehensive metabolic panel [[id number]] Collected: 01/27/23 1603    Lab Status: Final result Specimen: Blood from Arm, Right Updated: 01/27/23 1640     Sodium 140 mmol/L      Potassium 4 2 mmol/L      Chloride 108 mmol/L      CO2 24 mmol/L      ANION GAP 8 mmol/L      BUN 25 mg/dL      Creatinine 1 05 mg/dL      Glucose 107 mg/dL      Calcium 9 0 mg/dL      AST 30 U/L      ALT 46 U/L      Alkaline Phosphatase 43 U/L      Total Protein 7 0 g/dL      Albumin 4 0 g/dL      Total Bilirubin 0 51 mg/dL      eGFR 69 ml/min/1 73sq m     Narrative:      [place] guidelines for Chronic Kidney Disease (CKD):   •  Stage 1 with normal or high GFR (GFR > 90 mL/min/1 73 square meters)  •  Stage 2 Mild CKD (GFR = 60-89 mL/min/1 73 square meters)  •  Stage 3A Moderate CKD (GFR = 45-59 mL/min/1 73 square meters)  •  Stage 3B Moderate CKD (GFR = 30-44 mL/min/1 73 square meters)  •  Stage 4 Severe CKD (GFR = 15-29 mL/min/1 73 square meters)  •  Stage 5 End Stage CKD (GFR <15 mL/min/1 73 square meters)  Note: GFR calculation is accurate only with a steady state creatinine    D-Dimer [[id number]]  (Abnormal) Collected: 01/27/23 1603    Lab Status: Final result Specimen: Blood from Arm, Right Updated: 01/27/23 1637     D-Dimer, Quant 0 74 ug/ml FEU     Narrative: In the evaluation for possible pulmonary embolism, in the appropriate (Well's Score of 4 or less) patient, the age adjusted d-dimer cutoff for this patient can be calculated as:    Age x 0 01 (in ug/mL) for Age-adjusted D-dimer exclusion threshold for a patient over 50 years      CBC and differential [[id number]] Collected: 01/27/23 1603    Lab Status: Final result Specimen: Blood from Arm, Right Updated: 01/27/23 1619     WBC 5 25 Thousand/uL      RBC 4 53 Million/uL      Hemoglobin 15 0 g/dL      Hematocrit 43 9 %      MCV 97 fL      MCH 33 1 pg      MCHC 34 2 g/dL      RDW 12 3 %      MPV 9 8 fL      Platelets 288 Thousands/uL      nRBC 0 /100 WBCs      Neutrophils Relative 62 %      Immat GRANS % 0 %      Lymphocytes Relative 26 %      Monocytes Relative 10 % Eosinophils Relative 2 %      Basophils Relative 0 %      Neutrophils Absolute 3 27 Thousands/µL      Immature Grans Absolute 0 01 Thousand/uL      Lymphocytes Absolute 1 34 Thousands/µL      Monocytes Absolute 0 51 Thousand/µL      Eosinophils Absolute 0 10 Thousand/µL      Basophils Absolute 0 02 Thousands/µL           XR chest 2 views   Final Result by Priscilla Kirk MD (01/28 9972)      No acute cardiopulmonary disease  Workstation performed: [id number]             ED COURSE & MEDICAL DECISION MAKING  Procedures                     Medications   ketorolac (TORADOL) injection 15 mg (15 mg Intravenous Given 1/27/23 1719)         ED Course as of 01/28/23 2049 Fri Jan 27, 2023   1822 Patient updated  Chest pain is possibly better after Toradol although some is still present  We are awaiting repeat troponin  D-dimer is normal for patient's age, no indication for CT chest angiography at this time  1850 Chest x-ray to my review is without infiltrates, without cardiomegaly, without pneumothorax  Formal read is pending         MDM    CLINICAL IMPRESSION  Final diagnoses:   None       DISPOSITION  ED Disposition     None      Follow-up Information    None         DISCHARGE MEDICATIONS  Patient's Medications   Discharge Prescriptions    No medications on file        Karey Carrizales MD  01/28/23 2050

## 2023-01-27 NOTE — DISCHARGE INSTRUCTIONS
Your evaluation today does not reveal any dangerous causes of chest pain such as a heart attack, pulmonary embolism (blood clot in the lung), pneumothorax (collapsed lung), pneumonia, or other dangerous causes  Your COVID-19, influenza, and RSV PCR is negative  Please continue taking Tylenol and/or Motrin for your discomfort  Please follow-up with cardiology for further evaluation and assessment of possible underlying heart disease  Please seek medical attention if your symptoms are worsening, such as worsening shortness of breath, worsening chest pain, lightheadedness or loss of consciousness

## 2023-01-31 LAB
ATRIAL RATE: 70 BPM
P AXIS: 59 DEGREES
PR INTERVAL: 162 MS
QRS AXIS: 31 DEGREES
QRSD INTERVAL: 80 MS
QT INTERVAL: 424 MS
QTC INTERVAL: 457 MS
T WAVE AXIS: 36 DEGREES
VENTRICULAR RATE: 70 BPM

## 2023-02-15 ENCOUNTER — CONSULT (OUTPATIENT)
Dept: CARDIOLOGY CLINIC | Facility: HOSPITAL | Age: 78
End: 2023-02-15
Attending: EMERGENCY MEDICINE

## 2023-02-15 VITALS
HEIGHT: 65 IN | DIASTOLIC BLOOD PRESSURE: 68 MMHG | SYSTOLIC BLOOD PRESSURE: 138 MMHG | HEART RATE: 87 BPM | BODY MASS INDEX: 30.49 KG/M2 | WEIGHT: 183 LBS | OXYGEN SATURATION: 94 %

## 2023-02-15 DIAGNOSIS — E78.2 MIXED DYSLIPIDEMIA: Primary | ICD-10-CM

## 2023-02-15 DIAGNOSIS — R07.89 ATYPICAL CHEST PAIN: ICD-10-CM

## 2023-02-15 DIAGNOSIS — I10 HYPERTENSION: ICD-10-CM

## 2023-02-15 RX ORDER — NIACIN 500 MG
500 TABLET ORAL
COMMUNITY

## 2023-02-15 NOTE — PATIENT INSTRUCTIONS
Dietary changes were discussed in detail to lower triglycerides, printed information was also provided  Overall decrease intake of carbohydrates and substitute with salads/fresh fruit/greens  Decrease intake of saturated fat-butter, cheese, cream, red meat  Substitute  whole wheat products (with more fiber) instead of refined carbohydrate such as white rice, white pasta, sugar, white bread etc    Increase overall intake of fiber/salads  If you are a diabetic, better control of his diabetes will also help-the above dietary changes should also help improve Diabetes control  Limit intake of Alcohol to a minimum - no more than 2 drinks per week  Increase intake of fish such as [place] and [place] - which have omega-3 fatty acids  Increase activity level-exercise-can start at 10 min a day and increase to 30 min a day -5 times a week  Weight loss from the about dietary changes and exercise will further help to lower your triglycerides  This should also lower your risk of developing diabetes and if you are a diabetic, will also help your glucose control  Please call if you are willing to see a dietitian

## 2023-02-15 NOTE — PROGRESS NOTES
Cardiology Consultation     Jessenia Saez  [id number]  1945  [address] [facility] [place] [address] [place] [address] [id number]      Diagnoses and all orders for this visit:    Mixed dyslipidemia  -     Lipid Panel with Direct LDL reflex; Future    Atypical chest pain  -     Ambulatory Referral to Cardiology  -     Stress test only, exercise; Future    Hypertension  -     Ambulatory Referral to Cardiology  -     Stress test only, exercise; Future    Other orders  -     niacin 500 mg tablet; Take 500 mg by mouth daily with breakfast      I had the pleasure of seeing Jessenia Saez for a consultation regarding chest pain requested by Karla Olson MD    History of the Presenting Illness, Discussion/Summary and my Plan are as follows:::    Mable Bishop is a pleasant 70-year-old gentleman with hypertension, mixed dyslipidemia, lifelong non-smoker, no alcohol use, fatty liver, family history of hypertension who is here for further evaluation of chest pains  His chest pains have been there for the last 2 to 3 weeks, intermittent, sharp, usually bilaterally in the thoracoabdominal junction, sometimes brought on by exertion but not particularly, no relieving factors, no association with food  Had 1 presentation to the ED with negative evaluation including ECG, enzymes  Past is mixed dyslipidemia, he was initially on niacin and fish oil about 4 months ago  He is physically very active although does not lets it on, 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  Plan:    Chest pain: Atypical, will proceed with an exercise treadmill stress test  Risk factors include hypertension, mixed dyslipidemia, insulin resistance-fatty liver as well    Hypertension: Controlled    Mixed dyslipidemia: He would like to see what it is now on niacin and fish oil, reasonable, I do think he might need a statin in the future    Dietary changes were discussed as well  Follow-up in 6 to 8 weeks     Latest Reference Range & Units 09/11/18 09:03 07/30/22 08:30   Cholesterol See Comment mg/dL 157 201 (H)   Triglycerides See Comment mg/dL 405 (H) 505 (H)   HDL >=40 mg/dL 32 (L) 35 (L)   Non-HDL Cholesterol mg/dl  166   LDL Calculated 0 - 100 mg/dL See Comment See Comment   LDL CHOLESTEROL DIRECT 0 - 100 mg/dl 69    (H): Data is abnormally high  (L): Data is abnormally low    1  Mixed dyslipidemia  Lipid Panel with Direct LDL reflex      2  Atypical chest pain  Ambulatory Referral to Cardiology    Stress test only, exercise      3   Hypertension  Ambulatory Referral to Cardiology    Stress test only, exercise        Patient Active Problem List   Diagnosis   • Fall   • Closed fracture of multiple ribs of right side   • Acute pain due to trauma   • Abdominal distention   • Vitamin D deficiency   • Left inguinal hernia   • Hypertension   • BPH with urinary obstruction   • Mixed dyslipidemia   • Atypical chest pain     Past Medical History:   Diagnosis Date   • GERD (gastroesophageal reflux disease)    • Hypertension      Social History     Socioeconomic History   • Marital status: Single     Spouse name: Not on file   • Number of children: Not on file   • Years of education: Not on file   • Highest education level: Not on file   Occupational History   • Not on file   Tobacco Use   • Smoking status: Never   • Smokeless tobacco: Never   Vaping Use   • Vaping Use: Never used   Substance and Sexual Activity   • Alcohol use: Yes     Comment: 1-2 drinks a month   • Drug use: Never   • Sexual activity: Not Currently   Other Topics Concern   • Not on file   Social History Narrative    Caffeine use- coffee on occasion     Social Determinants of Health     Financial Resource Strain: Not on file   Food Insecurity: Not on file   Transportation Needs: Not on file   Physical Activity: Not on file   Stress: Not on file   Social Connections: Not on file   Intimate Partner Violence: Not on file   Housing Stability: Not on file      Family History   Problem Relation Age of Onset   • Hypertension Father    • Diabetes Father      Past Surgical History:   Procedure Laterality Date   • KNEE ARTHROSCOPY         Current Outpatient Medications:   •  amLODIPine (NORVASC) 5 mg tablet, Take 5 mg by mouth daily, Disp: , Rfl:   •  cholecalciferol (VITAMIN D3) 1,000 units tablet, Take 2,000 Units by mouth daily, Disp: , Rfl:   •  [employer] Omega-3 Krill Oil 300 MG CAPS, Take by mouth, Disp: , Rfl:   •  lisinopril (ZESTRIL) 10 mg tablet, Take 20 mg by mouth daily , Disp: , Rfl:   •  niacin 500 mg tablet, Take 500 mg by mouth daily with breakfast, Disp: , Rfl:   •  omeprazole (PriLOSEC) 20 mg delayed release capsule, Take 20 mg by mouth daily, Disp: , Rfl:   •  gabapentin (NEURONTIN) 100 mg capsule, Take 1 capsule (100 mg total) by mouth daily at bedtime for 7 days (Patient not taking: Reported on 2/15/2023), Disp: 7 capsule, Rfl: 0  •  methocarbamol (ROBAXIN) 500 mg tablet, Take 1 tablet (500 mg total) by mouth every 8 (eight) hours for 7 days (Patient not taking: Reported on 2/15/2023), Disp: 21 tablet, Rfl: 1  •  oxyCODONE (ROXICODONE) 5 mg immediate release tablet, Take 0 5-1 tablets (2 5-5 mg total) by mouth every 4 (four) hours as needed for moderate pain or severe painMax Daily Amount: 30 mg (Patient not taking: Reported on 7/6/2021), Disp: 30 tablet, Rfl: 0  •  polyethylene glycol (MIRALAX) 17 g packet, Take 17 g by mouth daily as needed (Constipation) for up to 7 days (Patient not taking: Reported on 2/15/2023), Disp: 119 g, Rfl: 0  •  senna-docusate sodium (SENOKOT S) 8 6-50 mg per tablet, Take 2 tablets by mouth daily for 7 days (Patient not taking: Reported on 2/15/2023), Disp: 14 tablet, Rfl: 0  Allergies   Allergen Reactions   • Sulfa Antibiotics      As a child     Vitals:    02/15/23 1304   BP: 138/68   Pulse: 87   SpO2: 94%   Weight: 83 kg (183 lb)   Height: 5' 5" (1 651 m) Imaging: XR chest 2 views    Result Date: 1/28/2023  Narrative: CHEST INDICATION:   Chest pain  COMPARISON:  4/29/2022 EXAM PERFORMED/VIEWS:  XR CHEST PA & LATERAL FINDINGS: Cardiomediastinal silhouette appears unremarkable  The lungs are clear  No pneumothorax or pleural effusion  Osseous structures appear within normal limits for patient age  Impression: No acute cardiopulmonary disease  Workstation performed: [id number]       Review of Systems:  Review of Systems   Constitutional: Negative  HENT: Negative  Eyes: Negative  Respiratory: Negative  Cardiovascular: Positive for chest pain  Negative for palpitations and leg swelling  Endocrine: Negative  Musculoskeletal: Negative  Physical Exam:    /68   Pulse 87   Ht 5' 5" (1 651 m)   Wt 83 kg (183 lb)   SpO2 94%   BMI 30 45 kg/m²   Physical Exam  Constitutional:       General: He is not in acute distress  Appearance: He is not ill-appearing, toxic-appearing or diaphoretic  HENT:      Nose: Nose normal  No congestion or rhinorrhea  Neck:      Vascular: No carotid bruit  Cardiovascular:      Rate and Rhythm: Normal rate and regular rhythm  Pulses: Normal pulses  Heart sounds: No murmur heard  No friction rub  No gallop  Pulmonary:      Effort: Pulmonary effort is normal  No respiratory distress  Breath sounds: No stridor  No wheezing or rhonchi  Musculoskeletal:         General: No swelling, tenderness, deformity or signs of injury  Normal range of motion  Cervical back: Normal range of motion  No rigidity or tenderness  Lymphadenopathy:      Cervical: No cervical adenopathy  Neurological:      Mental Status: He is alert  This note was completed in part utilizing [employer] direct voice recognition software     Grammatical errors, random word insertion, spelling mistakes, occasional wrong word or "sound-alike" substitutions and incomplete sentences may be an occasional consequence of the system secondary to software limitations, ambient noise and hardware issues  At the time of dictation, efforts were made to edit, clarify and /or correct errors  Please read the chart carefully and recognize, using context, where substitutions have occurred  If you have any questions or concerns about the context, text or information contained within the body of this dictation, please contact myself, the provider, for further clarification

## 2023-02-24 ENCOUNTER — HOSPITAL ENCOUNTER (OUTPATIENT)
Dept: NON INVASIVE DIAGNOSTICS | Facility: HOSPITAL | Age: 78
Discharge: HOME | End: 2023-02-24
Attending: INTERNAL MEDICINE

## 2023-02-24 VITALS
HEIGHT: 65 IN | HEART RATE: 72 BPM | DIASTOLIC BLOOD PRESSURE: 66 MMHG | WEIGHT: 183 LBS | BODY MASS INDEX: 30.49 KG/M2 | SYSTOLIC BLOOD PRESSURE: 122 MMHG

## 2023-02-24 DIAGNOSIS — I10 HYPERTENSION: ICD-10-CM

## 2023-02-24 DIAGNOSIS — R07.89 ATYPICAL CHEST PAIN: ICD-10-CM

## 2023-02-24 LAB
CHEST PAIN STATEMENT: NORMAL
MAX DIASTOLIC BP: 66 MMHG
MAX HEART RATE: 144 BPM
MAX HR PERCENT: 99 %
MAX HR: 144 BPM
MAX PREDICTED HEART RATE: 145 BPM
MAX. SYSTOLIC BP: 178 MMHG
PROTOCOL NAME: NORMAL
RATE PRESSURE PRODUCT: NORMAL
REASON FOR TERMINATION: NORMAL
SL CV STRESS RECOVERY BP: NORMAL MMHG
SL CV STRESS RECOVERY HR: 81 BPM
SL CV STRESS RECOVERY O2 SAT: 98 %
SL CV STRESS STAGE REACHED: 3
STRESS BASELINE BP: NORMAL MMHG
STRESS BASELINE HR: 72 BPM
STRESS O2 SAT REST: 97 %
STRESS PEAK HR: 144 BPM
STRESS POST ESTIMATED WORKLOAD: 8.5 METS
STRESS POST EXERCISE DUR MIN: 7 MIN
STRESS POST EXERCISE DUR SEC: 1 SEC
STRESS POST O2 SAT PEAK: 98 %
STRESS POST PEAK BP: 182 MMHG
STRESS ST DEPRESSION: 0 MM
TARGET HR FORMULA: NORMAL
TEST INDICATION: NORMAL
TIME IN EXERCISE PHASE: NORMAL

## 2023-02-25 ENCOUNTER — APPOINTMENT (OUTPATIENT)
Dept: LAB | Facility: HOSPITAL | Age: 78
End: 2023-02-25
Attending: INTERNAL MEDICINE

## 2023-02-25 DIAGNOSIS — E78.2 MIXED DYSLIPIDEMIA: ICD-10-CM

## 2023-02-26 LAB
CHOLEST SERPL-MCNC: 192 MG/DL
HDLC SERPL-MCNC: 39 MG/DL
LDLC SERPL CALC-MCNC: 90 MG/DL (ref 0–100)
TRIGL SERPL-MCNC: 316 MG/DL

## 2023-02-27 DIAGNOSIS — E78.2 MIXED DYSLIPIDEMIA: Primary | ICD-10-CM

## 2023-02-27 DIAGNOSIS — R07.89 ATYPICAL CHEST PAIN: Primary | ICD-10-CM

## 2023-02-27 DIAGNOSIS — R94.39 ABNORMAL STRESS TEST: ICD-10-CM

## 2023-02-27 RX ORDER — FENOFIBRATE 160 MG/1
160 TABLET ORAL DAILY
Qty: 90 TABLET | Refills: 3 | Status: SHIPPED | OUTPATIENT
Start: 2023-02-27

## 2023-02-27 NOTE — PROGRESS NOTES
Suboptimal efficacy, non-HDL cholesterol only decreased by 10 mg/dL    Triglycerides initially around 600, have improved but still at 300      We will discontinue OTC fish oil and niacin and start fenofibrate  Next option would be statins or Vascepa

## 2023-02-28 LAB
CHEST PAIN STATEMENT: NORMAL
MAX DIASTOLIC BP: 66 MMHG
MAX HEART RATE: 144 BPM
MAX PREDICTED HEART RATE: 145 BPM
MAX. SYSTOLIC BP: 178 MMHG
PROTOCOL NAME: NORMAL
REASON FOR TERMINATION: NORMAL
TARGET HR FORMULA: NORMAL
TEST INDICATION: NORMAL
TIME IN EXERCISE PHASE: NORMAL

## 2023-05-23 ENCOUNTER — OFFICE VISIT (OUTPATIENT)
Dept: CARDIOLOGY CLINIC | Facility: HOSPITAL | Age: 78
End: 2023-05-23

## 2023-05-23 VITALS
HEART RATE: 70 BPM | SYSTOLIC BLOOD PRESSURE: 122 MMHG | DIASTOLIC BLOOD PRESSURE: 68 MMHG | WEIGHT: 177 LBS | BODY MASS INDEX: 29.49 KG/M2 | HEIGHT: 65 IN | OXYGEN SATURATION: 96 %

## 2023-05-23 DIAGNOSIS — I10 HYPERTENSION, UNSPECIFIED TYPE: Primary | ICD-10-CM

## 2023-05-23 DIAGNOSIS — E78.2 MIXED DYSLIPIDEMIA: ICD-10-CM

## 2023-05-23 NOTE — PROGRESS NOTES
Cardiology Follow Up    Guilherme Cruz  1945  [id number]  [facility] [place]  [facility] [place] [address] [phone]    No diagnosis found  There are no diagnoses linked to this encounter  I had the pleasure of seeing Guilherme Cruz for a follow up visit  INTERVAL HISTORY: none, no further chest pains    History of the presenting illness, Discussion/Summary and My Plan are as follows:::    Emilia Santos is a pleasant 72-year-old gentleman with hypertension, mixed dyslipidemia, lifelong non-smoker, no alcohol use, fatty liver, family history of hypertension who is here for further evaluation of chest pains      He had very atypical chest pains with negative evaluation in the ED, check an exercise treadmill stress test which brought on chest pains but with no ECG evidence of ischemia  His chest pains have completely resolved while he remains physically very active  Had ordered a nuclear stress test      He also has mixed dyslipidemia, and was on niacin and fish oil, and with his triglycerides having improved to 316 in February 2023, discontinued his niacin and fish oil and started him on fenofibrate on which he is at this time  He remains physically very active although does not lets it on, 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      Plan:     Chest pain: Completely resolved, very atypical, negative ECG during exercise treadmill stress test that brought on chest pains but showed appropriate functional capacity  Considering the absence of any chest pains since then despite physically being very active, can hold off on additional evaluation at the current time      If he has any recurrence-we will check an exercise nuclear perfusion study     Hypertension: Controlled     Mixed dyslipidemia: Now on fenofibrate alone, recheck, if his direct LDL/non-HDL remains over goal, might consider a statin  Dietary changes were discussed as well  Follow up in 6mo     Latest Reference Range & Units 09/11/18 09:03 07/30/22 08:30 02/25/23 08:25   Cholesterol See Comment mg/dL 157 201 (H) 192   Triglycerides See Comment mg/dL 405 (H) 505 (H) 316 (H)   HDL >=40 mg/dL 32 (L) 35 (L) 39 (L)   Non-HDL Cholesterol mg/dl  166    LDL Calculated 0 - 100 mg/dL See Comment See Comment 90   LDL CHOLESTEROL DIRECT 0 - 100 mg/dl 69     (H): Data is abnormally high  (L): Data is abnormally low       Latest Reference Range & Units 07/30/22 08:30   Hemoglobin A1C Normal 3 8-5 6%; PreDiabetic 5 7-6 4%;  Diabetic >=6 5%; Glycemic control for adults with diabetes <7 0% % 5 7 (H)   eAG, EST AVG Glucose mg/dl 117   (H): Data is abnormally high      Patient Active Problem List   Diagnosis   • Fall   • Closed fracture of multiple ribs of right side   • Acute pain due to trauma   • Abdominal distention   • Vitamin D deficiency   • Left inguinal hernia   • Hypertension   • BPH with urinary obstruction   • Mixed dyslipidemia   • Atypical chest pain     Past Medical History:   Diagnosis Date   • GERD (gastroesophageal reflux disease)    • Hypertension      Social History     Socioeconomic History   • Marital status: Single     Spouse name: Not on file   • Number of children: Not on file   • Years of education: Not on file   • Highest education level: Not on file   Occupational History   • Not on file   Tobacco Use   • Smoking status: Never   • Smokeless tobacco: Never   Vaping Use   • Vaping Use: Never used   Substance and Sexual Activity   • Alcohol use: Yes     Comment: 1-2 drinks a month   • Drug use: Never   • Sexual activity: Not Currently   Other Topics Concern   • Not on file   Social History Narrative    Caffeine use- coffee on occasion     Social Determinants of Health     Financial Resource Strain: Not on file   Food Insecurity: Not on file   Transportation Needs: Not on file   Physical Activity: Not on file   Stress: Not on file   Social Connections: Not on file Intimate Partner Violence: Not on file   Housing Stability: Not on file      Family History   Problem Relation Age of Onset   • Hypertension Father    • Diabetes Father      Past Surgical History:   Procedure Laterality Date   • KNEE ARTHROSCOPY         Current Outpatient Medications:   •  amLODIPine (NORVASC) 5 mg tablet, Take 5 mg by mouth daily, Disp: , Rfl:   •  cholecalciferol (VITAMIN D3) 1,000 units tablet, Take 2,000 Units by mouth daily, Disp: , Rfl:   •  fenofibrate 160 MG tablet, Take 1 tablet (160 mg total) by mouth daily, Disp: 90 tablet, Rfl: 3  •  lisinopril (ZESTRIL) 10 mg tablet, Take 20 mg by mouth daily , Disp: , Rfl:   •  omeprazole (PriLOSEC) 20 mg delayed release capsule, Take 20 mg by mouth daily, Disp: , Rfl:   •  gabapentin (NEURONTIN) 100 mg capsule, Take 1 capsule (100 mg total) by mouth daily at bedtime for 7 days (Patient not taking: Reported on 2/15/2023), Disp: 7 capsule, Rfl: 0  •  methocarbamol (ROBAXIN) 500 mg tablet, Take 1 tablet (500 mg total) by mouth every 8 (eight) hours for 7 days (Patient not taking: Reported on 2/15/2023), Disp: 21 tablet, Rfl: 1  •  oxyCODONE (ROXICODONE) 5 mg immediate release tablet, Take 0 5-1 tablets (2 5-5 mg total) by mouth every 4 (four) hours as needed for moderate pain or severe painMax Daily Amount: 30 mg (Patient not taking: Reported on 7/6/2021), Disp: 30 tablet, Rfl: 0  •  polyethylene glycol (MIRALAX) 17 g packet, Take 17 g by mouth daily as needed (Constipation) for up to 7 days (Patient not taking: Reported on 2/15/2023), Disp: 119 g, Rfl: 0  •  senna-docusate sodium (SENOKOT S) 8 6-50 mg per tablet, Take 2 tablets by mouth daily for 7 days (Patient not taking: Reported on 2/15/2023), Disp: 14 tablet, Rfl: 0  Allergies   Allergen Reactions   • Sulfa Antibiotics      As a child       Imaging: No results found  Review of Systems:  Review of Systems   Constitutional: Negative  HENT: Negative  Eyes: Negative      Respiratory: "Negative  Cardiovascular: Negative  Endocrine: Negative  Musculoskeletal: Negative  Physical Exam:  /68   Pulse 70   Ht 5' 5\" (1 651 m)   Wt 80 3 kg (177 lb)   SpO2 96%   BMI 29 45 kg/m²   Physical Exam  Constitutional:       General: He is not in acute distress  Appearance: He is not ill-appearing or toxic-appearing  HENT:      Nose: Nose normal  No congestion or rhinorrhea  Mouth/Throat:      Mouth: Mucous membranes are moist       Pharynx: No oropharyngeal exudate or posterior oropharyngeal erythema  Neck:      Vascular: No carotid bruit  Cardiovascular:      Rate and Rhythm: Normal rate  Heart sounds: No murmur heard  No friction rub  No gallop  Pulmonary:      Effort: Pulmonary effort is normal  No respiratory distress  Breath sounds: No wheezing or rhonchi  Musculoskeletal:         General: No tenderness, deformity or signs of injury  Normal range of motion  Cervical back: Normal range of motion  No rigidity or tenderness  Lymphadenopathy:      Cervical: No cervical adenopathy  Skin:     General: Skin is warm  Coloration: Skin is not jaundiced or pale  Findings: No bruising or erythema  Neurological:      Mental Status: He is alert  This note was completed in part utilizing [employer] direct voice recognition software  Grammatical errors, random word insertion, spelling mistakes, occasional wrong word or \"sound-alike\" substitutions and incomplete sentences may be an occasional consequence of the system secondary to software limitations, ambient noise and hardware issues  At the time of dictation, efforts were made to edit, clarify and /or correct errors  Please read the chart carefully and recognize, using context, where substitutions have occurred    If you have any questions or concerns about the context, text or information contained within the body of this dictation, please contact myself, the provider, for " further clarification

## 2023-07-19 ENCOUNTER — OFFICE VISIT (OUTPATIENT)
Dept: URGENT CARE | Facility: MEDICAL CENTER | Age: 78
End: 2023-07-19
Payer: MEDICARE

## 2023-07-19 VITALS
TEMPERATURE: 97.7 F | OXYGEN SATURATION: 97 % | BODY MASS INDEX: 27.32 KG/M2 | SYSTOLIC BLOOD PRESSURE: 132 MMHG | HEIGHT: 66 IN | DIASTOLIC BLOOD PRESSURE: 74 MMHG | HEART RATE: 53 BPM | RESPIRATION RATE: 20 BRPM | WEIGHT: 170 LBS

## 2023-07-19 DIAGNOSIS — J32.9 SINUSITIS, UNSPECIFIED CHRONICITY, UNSPECIFIED LOCATION: Primary | ICD-10-CM

## 2023-07-19 PROBLEM — E78.1 PURE HYPERTRIGLYCERIDEMIA: Status: ACTIVE | Noted: 2022-10-31

## 2023-07-19 PROBLEM — K21.9 GASTROESOPHAGEAL REFLUX DISEASE WITHOUT ESOPHAGITIS: Status: ACTIVE | Noted: 2022-10-28

## 2023-07-19 PROBLEM — A69.20 LYME DISEASE: Status: ACTIVE | Noted: 2022-10-31

## 2023-07-19 PROCEDURE — G0463 HOSPITAL OUTPT CLINIC VISIT: HCPCS

## 2023-07-19 PROCEDURE — 99212 OFFICE O/P EST SF 10 MIN: CPT

## 2023-07-19 RX ORDER — AMOXICILLIN AND CLAVULANATE POTASSIUM 875; 125 MG/1; MG/1
1 TABLET, FILM COATED ORAL EVERY 12 HOURS SCHEDULED
Qty: 14 TABLET | Refills: 0 | Status: SHIPPED | OUTPATIENT
Start: 2023-07-19 | End: 2023-07-26

## 2023-07-19 NOTE — PROGRESS NOTES
[facility]        NAME: Ginny Rodriguez is a 78 y.o. male  : 1945    MRN: [id number]  DATE: 2023  TIME: 9:32 AM    Assessment and Plan   Sinusitis, unspecified chronicity, unspecified location [J32.9]  1. Sinusitis, unspecified chronicity, unspecified location  amoxicillin-clavulanate (AUGMENTIN) 875-125 mg per tablet            Patient Instructions       Follow up with PCP in 3-5 days. Proceed to  ER if symptoms worsen. Chief Complaint     Chief Complaint   Patient presents with   • Facial Pain     Face and dental pain that started 1 week ago, treated with tylenol with some relief but now tylenol is not working, pt. Has a dentist but has not been in contact with them, deneis nasal congestion, headache or ear pain         History of Present Illness       X1 week of dental and face pain. Has been treating with Tylenol with mild relief initially but no longer getting relief with the Tylenol. Patient has not contacted his dental specialist.  Patient denies any pain with biting down or chewing. He denies any specific dental discomfort reports the pain to be bone deep and pressure feeling within the structures of his face. He denies any sinus congestion but did have a runny nose this morning. Differential diagnosis includes but not limited to sinus infection, dental infection, trigeminal neuralgia. Will treat as a sinus infection at this time given the patient's complaints of pressure discomforts advised to follow-up with primary care provider in the next several days. Also advised if he is not having relief of symptoms or improvement of symptoms in the next 1 to 2 days he should be reevaluated or if he develops any worsening of symptoms he should be reevaluated immediately. Patient verbalized understanding      Review of Systems   Review of Systems   Constitutional: Negative for appetite change, chills, fatigue and fever. HENT: Positive for rhinorrhea.  Negative for congestion, dental problem, ear discharge, ear pain, facial swelling, hearing loss, mouth sores, postnasal drip, sinus pressure, sinus pain, sore throat and trouble swallowing. Facial pain     Eyes: Negative for pain and visual disturbance. Respiratory: Negative for cough and shortness of breath. Cardiovascular: Negative for chest pain and palpitations. Gastrointestinal: Negative for abdominal pain, constipation, diarrhea, nausea and vomiting. Musculoskeletal: Negative for arthralgias, back pain, neck pain and neck stiffness. Skin: Negative for color change and rash. Neurological: Negative for dizziness, seizures, facial asymmetry, speech difficulty, light-headedness, numbness and headaches. Mild headache reported with the pressure in his head. All other systems reviewed and are negative.         Current Medications       Current Outpatient Medications:   •  amoxicillin-clavulanate (AUGMENTIN) 875-125 mg per tablet, Take 1 tablet by mouth every 12 (twelve) hours for 7 days, Disp: 14 tablet, Rfl: 0  •  amLODIPine (NORVASC) 5 mg tablet, Take 5 mg by mouth daily, Disp: , Rfl:   •  cholecalciferol (VITAMIN D3) 1,000 units tablet, Take 2,000 Units by mouth daily, Disp: , Rfl:   •  fenofibrate 160 MG tablet, Take 1 tablet (160 mg total) by mouth daily, Disp: 90 tablet, Rfl: 3  •  lisinopril (ZESTRIL) 10 mg tablet, Take 20 mg by mouth daily , Disp: , Rfl:   •  omeprazole (PriLOSEC) 20 mg delayed release capsule, Take 20 mg by mouth daily, Disp: , Rfl:     Current Allergies     Allergies as of 07/19/2023 - Reviewed 07/19/2023   Allergen Reaction Noted   • Sulfa antibiotics  03/10/2015            The following portions of the patient's history were reviewed and updated as appropriate: allergies, current medications, past family history, past medical history, past social history, past surgical history and problem list.     Past Medical History:   Diagnosis Date   • GERD (gastroesophageal reflux disease)    • Hypertension        Past Surgical History:   Procedure Laterality Date   • KNEE ARTHROSCOPY         Family History   Problem Relation Age of Onset   • Hypertension Father    • Diabetes Father          Medications have been verified. Objective   /74   Pulse (!) 53   Temp 97.7 °F (36.5 °C)   Resp 20   Ht 5' 6" (1.676 m)   Wt 77.1 kg (170 lb)   SpO2 97%   BMI 27.44 kg/m²        Physical Exam     Physical Exam  Vitals and nursing note reviewed. Constitutional:       General: He is not in acute distress. Appearance: Normal appearance. He is normal weight. He is not ill-appearing. HENT:      Head: Normocephalic and atraumatic. Comments: Area of pressure discomfort. Right Ear: Ear canal and external ear normal. Tympanic membrane is erythematous and bulging. Left Ear: Ear canal and external ear normal. Tympanic membrane is erythematous and bulging. Ears:      Comments: Dull TM     Nose: Nose normal.      Right Sinus: No maxillary sinus tenderness or frontal sinus tenderness. Left Sinus: No maxillary sinus tenderness or frontal sinus tenderness. Comments: Patient reports pain "deep" in the bone feeling. Mouth/Throat:      Lips: Pink. Mouth: Mucous membranes are moist.      Dentition: Dental caries present. No dental tenderness, gingival swelling or dental abscesses. Pharynx: Oropharynx is clear. Comments: No pain with biting down unless it is a food which is tough to chew. Eyes:      General: Lids are normal.      Extraocular Movements: Extraocular movements intact. Conjunctiva/sclera: Conjunctivae normal.      Pupils: Pupils are equal, round, and reactive to light. Neck:      Trachea: Trachea and phonation normal.   Cardiovascular:      Rate and Rhythm: Normal rate and regular rhythm. Pulses: Normal pulses.       Heart sounds: Normal heart sounds and S2 normal.   Pulmonary:      Effort: Pulmonary effort is normal.      Breath sounds: Normal breath sounds. Abdominal:      General: Abdomen is flat. Bowel sounds are normal.      Palpations: Abdomen is soft. Musculoskeletal:         General: Normal range of motion. Cervical back: Full passive range of motion without pain, normal range of motion and neck supple. Lymphadenopathy:      Cervical: No cervical adenopathy. Skin:     General: Skin is warm and dry. Capillary Refill: Capillary refill takes less than 2 seconds. Findings: No rash. Neurological:      General: No focal deficit present. Mental Status: He is alert and oriented to person, place, and time. GCS: GCS eye subscore is 4. GCS verbal subscore is 5. GCS motor subscore is 6. Cranial Nerves: Cranial nerves 2-12 are intact. Sensory: Sensation is intact. Motor: Motor function is intact.    Psychiatric:         Mood and Affect: Mood normal.         Behavior: Behavior normal.

## 2023-07-19 NOTE — PATIENT INSTRUCTIONS
You may take over the counter Tylenol (Acetaminophen) and/or Motrin (Ibuprofen) as needed, as directed on packaging. Be sure to get plenty of rest, and drinking fluids to remain hydrated. Applying warm compresses and nasal sprays may help with the sinus pressure.

## 2023-11-15 ENCOUNTER — HOSPITAL ENCOUNTER (OUTPATIENT)
Dept: NUCLEAR MEDICINE | Facility: HOSPITAL | Age: 78
Discharge: HOME | End: 2023-11-15
Attending: INTERNAL MEDICINE
Payer: MEDICARE

## 2023-11-15 ENCOUNTER — APPOINTMENT (OUTPATIENT)
Dept: LAB | Facility: HOSPITAL | Age: 78
End: 2023-11-15
Payer: MEDICARE

## 2023-11-15 DIAGNOSIS — R94.39 ABNORMAL STRESS TEST: ICD-10-CM

## 2023-11-15 DIAGNOSIS — E78.2 MIXED DYSLIPIDEMIA: ICD-10-CM

## 2023-11-15 DIAGNOSIS — R07.89 ATYPICAL CHEST PAIN: ICD-10-CM

## 2023-11-15 LAB
CHOLEST SERPL-MCNC: 146 MG/DL
HDLC SERPL-MCNC: 42 MG/DL
LDLC SERPL CALC-MCNC: 79 MG/DL (ref 0–100)
MAX HR PERCENT: 92 %
MAX HR: 134 BPM
NUC STRESS EJECTION FRACTION: 60 %
SL CV REST NUCLEAR ISOTOPE DOSE: 10.1 MCI
SL CV STRESS NUCLEAR ISOTOPE DOSE: 32.7 MCI
SL CV STRESS STAGE REACHED: 3
STRESS ANGINA INDEX: 0
STRESS PEAK HR: 134 BPM
STRESS POST ESTIMATED WORKLOAD: 7.3 METS
STRESS POST EXERCISE DUR MIN: 6 MIN
STRESS POST EXERCISE DUR SEC: 14 SEC
STRESS/REST PERFUSION RATIO: 1.03
TRIGL SERPL-MCNC: 126 MG/DL

## 2023-11-15 PROCEDURE — A9502 TC99M TETROFOSMIN: HCPCS

## 2023-11-15 PROCEDURE — 93017 CV STRESS TEST TRACING ONLY: CPT

## 2023-11-15 PROCEDURE — 36415 COLL VENOUS BLD VENIPUNCTURE: CPT

## 2023-11-15 PROCEDURE — 78452 HT MUSCLE IMAGE SPECT MULT: CPT

## 2023-11-15 PROCEDURE — 78452 HT MUSCLE IMAGE SPECT MULT: CPT | Performed by: INTERNAL MEDICINE

## 2023-11-15 PROCEDURE — 93016 CV STRESS TEST SUPVJ ONLY: CPT | Performed by: INTERNAL MEDICINE

## 2023-11-15 PROCEDURE — G1004 CDSM NDSC: HCPCS

## 2023-11-15 PROCEDURE — 80061 LIPID PANEL: CPT

## 2023-11-15 PROCEDURE — 93018 CV STRESS TEST I&R ONLY: CPT | Performed by: INTERNAL MEDICINE

## 2023-11-17 DIAGNOSIS — I10 HYPERTENSION, UNSPECIFIED TYPE: Primary | ICD-10-CM

## 2023-11-17 DIAGNOSIS — I10 HYPERTENSIVE RESPONSE TO EXERCISE: ICD-10-CM

## 2023-11-17 RX ORDER — AMLODIPINE BESYLATE 10 MG/1
10 TABLET ORAL DAILY
Qty: 90 TABLET | Refills: 3 | Status: SHIPPED | OUTPATIENT
Start: 2023-11-17

## 2023-11-20 NOTE — PROGRESS NOTES
Cardiology Follow Up    Ford Gonzalez  1945  [id number]  [facility] [address] [place] [address]  [phone]    1. Primary hypertension  POCT ECG      2. Mixed dyslipidemia  fenofibrate 160 MG tablet          Discussion/Summary:  Overall he has been feeling well from a cardiac standpoint. Functional capacity remains excellent for his age. Nuclear stress test last week was negative for myocardial ischemia and scar. He did have a hypertensive response to exercise (peak SBP >200) for which his amlodipine was increased to 10 mg daily. His blood pressure is well controlled in the office today. His lipid panel from last week was reviewed. His triglycerides have improved to 126 (in 2022 were 505). Total cholesterol, LDL, and HDL are at goal. Continue fenofibrate. No cardiac testing is needed at this time. He will RTO in 6 months with Dr. Talia Henley or sooner if necessary. He will call the office with any concerns in the interim. Interval History:   Ford Gonzalez is a 78 y.o. male with hypertension, dyslipidemia who presents to the office today for routine follow up. Since his last office visit he has been feeling overall well. 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 He denies any exertional chest pain/pressure/discomfort or shortness of breath. He does have some exertional fatigue but otherwise denies any limiting factors. He denies lower extremity edema, orthopnea, or PND. He denies lightness, dizziness, or syncope. He denies palpitations. He has been taking all medications as prescribed. He had a nuclear stress test last week which showed good functional capacity and was negative for myocardial ischemia or scar. However, he was noted to have a significant hypertensive response to exercise and his amlodipine was increased to 10 mg daily.     Medical Problems       Problem List       Fall Closed fracture of multiple ribs of right side    Acute pain due to trauma    Abdominal distention    Vitamin D deficiency    Left inguinal hernia    Hypertension    BPH with urinary obstruction    Mixed dyslipidemia    Atypical chest pain    Abnormal ECG    Gastroesophageal reflux disease without esophagitis    Lyme disease    Pure hypertriglyceridemia        Past Medical History:   Diagnosis Date    GERD (gastroesophageal reflux disease)     Hypertension      Social History     Socioeconomic History    Marital status: Single     Spouse name: Not on file    Number of children: Not on file    Years of education: Not on file    Highest education level: Not on file   Occupational History    Not on file   Tobacco Use    Smoking status: Never     Passive exposure: Never    Smokeless tobacco: Never   Vaping Use    Vaping Use: Never used   Substance and Sexual Activity    Alcohol use: Yes     Comment: 1-2 drinks a month    Drug use: Never    Sexual activity: Not Currently   Other Topics Concern    Not on file   Social History Narrative    Caffeine use- coffee on occasion     Social Determinants of Health     Financial Resource Strain: Not on file   Food Insecurity: Not on file   Transportation Needs: Not on file   Physical Activity: Not on file   Stress: Not on file   Social Connections: Not on file   Intimate Partner Violence: Not on file   Housing Stability: Not on file      Family History   Problem Relation Age of Onset    Hypertension Father     Diabetes Father      Past Surgical History:   Procedure Laterality Date    KNEE ARTHROSCOPY         Current Outpatient Medications:     amLODIPine (NORVASC) 10 mg tablet, Take 1 tablet (10 mg total) by mouth daily, Disp: 90 tablet, Rfl: 3    cholecalciferol (VITAMIN D3) 1,000 units tablet, Take 2,000 Units by mouth daily, Disp: , Rfl:     fenofibrate 160 MG tablet, Take 1 tablet (160 mg total) by mouth daily, Disp: 90 tablet, Rfl: 3    lisinopril (ZESTRIL) 10 mg tablet, Take 20 mg by mouth daily , Disp: , Rfl:     omeprazole (PriLOSEC) 20 mg delayed release capsule, Take 20 mg by mouth daily, Disp: , Rfl:   Allergies   Allergen Reactions    Sulfa Antibiotics      As a child       Labs:     Chemistry        Component Value Date/Time     03/09/2015 0849    K 4.2 01/27/2023 1603    K 4.5 03/09/2015 0849     01/27/2023 1603     03/09/2015 0849    CO2 24 01/27/2023 1603    CO2 23.9 03/09/2015 0849    BUN 25 01/27/2023 1603    BUN 15 03/09/2015 0849    CREATININE 1.05 01/27/2023 1603    CREATININE 0.85 03/09/2015 0849        Component Value Date/Time    CALCIUM 9.0 01/27/2023 1603    CALCIUM 8.1 (L) 03/09/2015 0849    ALKPHOS 43 01/27/2023 1603    ALKPHOS 122 (H) 03/09/2015 0849    AST 30 01/27/2023 1603    AST 35 03/09/2015 0849    ALT 46 01/27/2023 1603    ALT 87 (H) 03/09/2015 0849    BILITOT 0.3 03/09/2015 0849            No results found for: "CHOL"  Lab Results   Component Value Date    HDL 42 11/15/2023    HDL 39 (L) 02/25/2023    HDL 35 (L) 07/30/2022     Lab Results   Component Value Date    LDLCALC 79 11/15/2023    LDLCALC 90 02/25/2023    [facility]  07/30/2022      Comment:      Calculated LDL invalid, triglycerides >400 mg/dl  This screening LDL is a calculated result. It does not have the accuracy of the Direct Measured LDL in the monitoring of patients with hyperlipidemia and/or statin therapy. Direct Measure LDL (BNE610) must be ordered separately in these patients. Lab Results   Component Value Date    TRIG 126 11/15/2023    TRIG 316 (H) 02/25/2023    TRIG 505 (H) 07/30/2022     No results found for: "CHOLHDL"    Imaging: NM myocardial perfusion spect (stress and/or rest)    Result Date: 11/15/2023  Narrative:   Stress ECG: No ST deviation is noted. The ECG was negative for ischemia. The stress ECG is negative for ischemia after maximal exercise, without reproduction of symptoms. Perfusion: There are no perfusion defects.    Stress Function: Left ventricular function post-stress is normal. Stress ejection fraction is 60 %. Stress Combined Conclusion: The ECG and SPECT imaging portions of the stress study are concordant with no evidence of stress induced myocardial ischemia. Left ventricular perfusion is normal.       ECG: normal sinus rhythm  LVH  Inferior infarct    Review of Systems   All other systems reviewed and are negative. Vitals:    11/21/23 1248   BP: 130/60   Pulse: 63     Vitals:    11/21/23 1248   Weight: 79.7 kg (175 lb 12.8 oz)     Height: 5' 6" (167.6 cm)   Body mass index is 28.37 kg/m². Physical Exam:  Physical Exam  Vitals reviewed. Constitutional:       General: He is not in acute distress. Appearance: He is well-developed. He is not diaphoretic. HENT:      Head: Normocephalic and atraumatic. Eyes:      Pupils: Pupils are equal, round, and reactive to light. Neck:      Vascular: No carotid bruit. Cardiovascular:      Rate and Rhythm: Normal rate and regular rhythm. Pulses:           Radial pulses are 2+ on the right side and 2+ on the left side. Heart sounds: S1 normal and S2 normal. No murmur heard. Pulmonary:      Effort: Pulmonary effort is normal. No respiratory distress. Breath sounds: Normal breath sounds. No wheezing or rales. Abdominal:      General: There is no distension. Palpations: Abdomen is soft. Tenderness: There is no abdominal tenderness. Musculoskeletal:         General: Normal range of motion. Cervical back: Normal range of motion. Right lower leg: No edema. Left lower leg: No edema. Skin:     General: Skin is warm and dry. Findings: No erythema. Neurological:      General: No focal deficit present. Mental Status: He is alert and oriented to person, place, and time.    Psychiatric:         Mood and Affect: Mood normal.         Behavior: Behavior normal.

## 2023-11-21 ENCOUNTER — OFFICE VISIT (OUTPATIENT)
Dept: CARDIOLOGY CLINIC | Facility: HOSPITAL | Age: 78
End: 2023-11-21
Payer: MEDICARE

## 2023-11-21 VITALS
DIASTOLIC BLOOD PRESSURE: 60 MMHG | HEART RATE: 63 BPM | HEIGHT: 66 IN | SYSTOLIC BLOOD PRESSURE: 130 MMHG | BODY MASS INDEX: 28.25 KG/M2 | WEIGHT: 175.8 LBS

## 2023-11-21 DIAGNOSIS — I10 PRIMARY HYPERTENSION: Primary | ICD-10-CM

## 2023-11-21 DIAGNOSIS — E78.2 MIXED DYSLIPIDEMIA: ICD-10-CM

## 2023-11-21 PROCEDURE — 93000 ELECTROCARDIOGRAM COMPLETE: CPT | Performed by: PHYSICIAN ASSISTANT

## 2023-11-21 PROCEDURE — 99214 OFFICE O/P EST MOD 30 MIN: CPT | Performed by: PHYSICIAN ASSISTANT

## 2023-11-21 RX ORDER — FENOFIBRATE 160 MG/1
160 TABLET ORAL DAILY
Qty: 90 TABLET | Refills: 3 | Status: SHIPPED | OUTPATIENT
Start: 2023-11-21

## 2023-11-23 LAB
MAX HR PERCENT: 85 %
MAX HR: 133 BPM
STRESS BASELINE BP: NORMAL MMHG
STRESS BASELINE HR: 67 BPM
STRESS POST ESTIMATED WORKLOAD: 7.3 METS
STRESS POST PEAK HR: 134 BPM
STRESS POST PEAK SYSTOLIC BP: 208 MMHG

## 2024-08-12 ENCOUNTER — OFFICE VISIT (OUTPATIENT)
Dept: OBGYN CLINIC | Facility: CLINIC | Age: 79
End: 2024-08-12
Payer: MEDICARE

## 2024-08-12 VITALS
HEART RATE: 88 BPM | DIASTOLIC BLOOD PRESSURE: 73 MMHG | SYSTOLIC BLOOD PRESSURE: 155 MMHG | HEIGHT: 66 IN | BODY MASS INDEX: 28.12 KG/M2 | WEIGHT: 175 LBS

## 2024-08-12 DIAGNOSIS — M25.562 LEFT KNEE PAIN, UNSPECIFIED CHRONICITY: Primary | ICD-10-CM

## 2024-08-12 PROCEDURE — 20610 DRAIN/INJ JOINT/BURSA W/O US: CPT | Performed by: ORTHOPAEDIC SURGERY

## 2024-08-12 PROCEDURE — 99203 OFFICE O/P NEW LOW 30 MIN: CPT | Performed by: ORTHOPAEDIC SURGERY

## 2024-08-12 RX ORDER — BUPIVACAINE HYDROCHLORIDE 2.5 MG/ML
4 INJECTION, SOLUTION INFILTRATION; PERINEURAL
Status: COMPLETED | OUTPATIENT
Start: 2024-08-12 | End: 2024-08-12

## 2024-08-12 RX ORDER — TRIAMCINOLONE ACETONIDE 40 MG/ML
80 INJECTION, SUSPENSION INTRA-ARTICULAR; INTRAMUSCULAR
Status: COMPLETED | OUTPATIENT
Start: 2024-08-12 | End: 2024-08-12

## 2024-08-12 RX ADMIN — TRIAMCINOLONE ACETONIDE 80 MG: 40 INJECTION, SUSPENSION INTRA-ARTICULAR; INTRAMUSCULAR at 13:30

## 2024-08-12 RX ADMIN — BUPIVACAINE HYDROCHLORIDE 4 ML: 2.5 INJECTION, SOLUTION INFILTRATION; PERINEURAL at 13:30

## 2024-08-12 NOTE — PROGRESS NOTES
ASSESSMENT/PLAN:    Diagnoses and all orders for this visit:    Left knee pain, unspecified chronicity  -     XR knee 3 vw left non injury; Future    Other orders  -     Large joint arthrocentesis        X-rays of the patient's left knee are negative for any fracture dislocations.  There are moderate arthritic changes present.  Possible treatment options were discussed with the patient.  He elected for corticosteroid injection.  The patient's left knee was injected with Kenalog and Marcaine.  He tolerated the injection quite well.  Will follow-up with our office in 3 months.  He is acceptable to this plan.    Return in about 3 months (around 11/12/2024).    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  Under aseptic technique, left knee was injected with Kenalog and Marcaine.  He tolerated procedure quite well.  Return back in 3 months for reevaluation      _____________________________________________________  CHIEF COMPLAINT:  Chief Complaint   Patient presents with    Left Knee - Pain         SUBJECTIVE:  Inés Frey is a 78 y.o. male who presents to our office complaining of left knee pain.  The patient states that in April 2024 he for started to develop pain along his knee.  He states he was shoveling before the symptoms started.  There is no area of maximal tenderness along his knee.  He describes the pain as a generalized knee pain.  He denies any numbness or tingling.  He denies any fever or chills.    The following portions of the patient's history were reviewed and updated as appropriate: allergies, current medications, past family history, past medical history, past social history, past surgical history and problem list.    PAST MEDICAL HISTORY:  Past Medical History:   Diagnosis Date    GERD (gastroesophageal reflux disease)     Hypertension        PAST SURGICAL HISTORY:  Past Surgical History:   Procedure Laterality Date    KNEE ARTHROSCOPY         FAMILY HISTORY:  Family History  "  Problem Relation Age of Onset    Hypertension Father     Diabetes Father        SOCIAL HISTORY:  Social History     Tobacco Use    Smoking status: Never     Passive exposure: Never    Smokeless tobacco: Never   Vaping Use    Vaping status: Never Used   Substance Use Topics    Alcohol use: Yes     Comment: 1-2 drinks a month    Drug use: Never       MEDICATIONS:    Current Outpatient Medications:     amLODIPine (NORVASC) 10 mg tablet, Take 1 tablet (10 mg total) by mouth daily, Disp: 90 tablet, Rfl: 3    cholecalciferol (VITAMIN D3) 1,000 units tablet, Take 2,000 Units by mouth daily, Disp: , Rfl:     fenofibrate 160 MG tablet, Take 1 tablet (160 mg total) by mouth daily, Disp: 90 tablet, Rfl: 3    lisinopril (ZESTRIL) 10 mg tablet, Take 20 mg by mouth daily , Disp: , Rfl:     omeprazole (PriLOSEC) 20 mg delayed release capsule, Take 20 mg by mouth daily, Disp: , Rfl:     ALLERGIES:  Allergies   Allergen Reactions    Sulfa Antibiotics      As a child       ROS:  Review of Systems     Constitutional: Negative for fatigue, fever or loss of appetite.   HENT: Negative.    Respiratory: Negative for shortness of breath, dyspnea.    Cardiovascular: Negative for chest pain/tightness.   Gastrointestinal: Negative for abdominal pain, N/V.   Endocrine: Negative for cold/heat intolerance, unexplained weight loss/gain.   Genitourinary: Negative for flank pain, dysuria, hematuria.   Musculoskeletal: Positive for arthralgia   Skin: Negative for rash.    Neurological: Negative for numbness or tingling  Psychiatric/Behavioral: Negative for agitation.  _____________________________________________________  PHYSICAL EXAMINATION:    Blood pressure 155/73, pulse 88, height 5' 6\" (1.676 m), weight 79.4 kg (175 lb).    Constitutional: Oriented to person, place, and time. Appears well-developed and well-nourished. No distress.   HENT:   Head: Normocephalic.   Eyes: Conjunctivae are normal. Right eye exhibits no discharge. Left eye " "exhibits no discharge. No scleral icterus.   Cardiovascular: Normal rate.    Pulmonary/Chest: Effort normal.   Neurological: Alert and oriented to person, place, and time.   Skin: Skin is warm and dry. No rash noted. Not diaphoretic. No erythema. No pallor.   Psychiatric: Normal mood and affect. Behavior is normal. Judgment and thought content normal.      MUSCULOSKELETAL EXAMINATION:   Physical Exam  Ortho Exam    Left lower extremity is neurovascularly intact  Toes are pink and mobile  Compartments are soft  No effusion present  Negative Lachman, drawer or pivot shift  Brisk cap refill  Sensation intact  Range of motion of the knee is from 5 to 120 degrees  Objective:  BP Readings from Last 1 Encounters:   08/12/24 155/73      Wt Readings from Last 1 Encounters:   08/12/24 79.4 kg (175 lb)        BMI:   Estimated body mass index is 28.25 kg/m² as calculated from the following:    Height as of this encounter: 5' 6\" (1.676 m).    Weight as of this encounter: 79.4 kg (175 lb).      PROCEDURES PERFORMED:  Large joint arthrocentesis: L knee  Universal Protocol:  Risks and benefits: risks, benefits and alternatives were discussed  Consent given by: patient  Patient understanding: patient states understanding of the procedure being performed  Site marked: the operative site was marked  Patient identity confirmed: verbally with patient  Supporting Documentation  Indications: pain   Procedure Details  Location: knee - L knee  Preparation: Patient was prepped and draped in the usual sterile fashion  Needle size: 22 G  Ultrasound guidance: no  Approach: lateral  Medications administered: 4 mL bupivacaine 0.25 %; 80 mg triamcinolone acetonide 40 mg/mL    Patient tolerance: patient tolerated the procedure well with no immediate complications  Dressing:  Sterile dressing applied            Scribe Attestation      I,:  Willard Alonzo PA-C am acting as a scribe while in the presence of the attending physician.:       I,:  " Barrington Rivas DO personally performed the services described in this documentation    as scribed in my presence.:

## 2024-10-22 NOTE — PROGRESS NOTES
Cardiology Follow Up    Inés Frey  1945  [id number]  [facility] [address] [id number] [phone]    1. Primary hypertension  Basic metabolic panel      2. Lyme disease  AMB extended holter monitor    Echo complete w/ contrast if indicated      3. Dyslipidemia        4. Near syncope  POCT ECG    AMB extended holter monitor    Echo complete w/ contrast if indicated          Discussion/Summary:  He presents to the office today for follow-up.  He has been feeling generally unwell over the last 1 to 2 months. His main concerns are intermittent facial flushing as well as hypertension. He did have an episode of near syncope last week. He recently was found to have Lyme disease.  He was given a course of doxycycline which he has completed about 10 days ago.     His EKG today shows normal sinus rhythm without any conduction abnormalities. However given episode of near syncope in the setting of Lyme disease I will check a 2 week zio patch as well as an echocardiogram.    His blood pressure has been elevated. I will increase his lisinopril to 30 mg daily. He will check his blood pressure once daily and notify the office in 1 week with updated readings. He will also have a BMP in 1 week to reassess his renal function and electrolytes.     Prior Nuclear stress test 11/15/23 - negative for ischemia/scar, exercise time 6 minutes 14 seconds    He will RTO in December for his scheduled follow up visit with Dr. Sinclair or sooner if necessary. He will call the office with any concerns in the interim.     Interval History:   Inés Frey is a 78 y.o. male with hypertension and dyslipidemia who presents to the office today for routine follow up.    Over the last 2 months he has been feeling generally unwell.  He has been getting a flushed sensation that comes up over his neck and his face.  He states he  "gets an associated lightheaded feeling.  He states his hair is \"sensitive to the touch.\"  He was recently diagnosed with Lyme disease.  He he was given a course of time doxycycline.  He states he still continues with intermittent flushing.  He states last week he also had an episode where he was standing.  He felt as if he was going to pass out.  He was able to lay down and his symptoms did resolve.  He also has been checking his blood pressure at home.  He states it has been running elevated between 140 and 160 systolic, whereas before it was in the 120/130 systolic.  He denies any recent change in his diet or medication.  He denies any recurrent episodes.  He states he gets a feeling of \"heaviness\" which can occur in his knees, hips, or on his chest.  These are very fleeting symptoms.  He does not seem to get any chest pain or discomfort with exertion.  He does note overall fatigue.  He denies any lower extremity edema.    Medical Problems       Problem List       Fall    Closed fracture of multiple ribs of right side    Acute pain due to trauma    Abdominal distention    Vitamin D deficiency    Left inguinal hernia    Hypertension    BPH with urinary obstruction    Mixed dyslipidemia    Atypical chest pain    Abnormal ECG    Gastroesophageal reflux disease without esophagitis    Lyme disease    Pure hypertriglyceridemia        Past Medical History:   Diagnosis Date    GERD (gastroesophageal reflux disease)     Hypertension      Social History     Socioeconomic History    Marital status: Single     Spouse name: Not on file    Number of children: Not on file    Years of education: Not on file    Highest education level: Not on file   Occupational History    Not on file   Tobacco Use    Smoking status: Never     Passive exposure: Never    Smokeless tobacco: Never   Vaping Use    Vaping status: Never Used   Substance and Sexual Activity    Alcohol use: Yes     Comment: 1-2 drinks a month    Drug use: Never    Sexual " activity: Not Currently   Other Topics Concern    Not on file   Social History Narrative    Caffeine use- coffee on occasion     Social Determinants of Health     Financial Resource Strain: Low Risk  (11/1/2023)    [facility]    Overall Financial Resource Strain (CARDIA)     Difficulty of Paying Living Expenses: Not hard at all   Food Insecurity: No Food Insecurity (11/1/2023)    Received from [facility], [facility]    Hunger Vital Sign     Worried About Running Out of Food in the Last Year: Never true     Ran Out of Food in the Last Year: Never true   Transportation Needs: No Transportation Needs (11/1/2023)    Received from [facility], [facility]    PRAPARE - Transportation     Lack of Transportation (Medical): No     Lack of Transportation (Non-Medical): No   Physical Activity: Inactive (11/1/2023)    Received from [facility]    Exercise Vital Sign     Days of Exercise per Week: 0 days     Minutes of Exercise per Session: 0 min   Stress: No Stress Concern Present (11/1/2023)    Received from [facility], [facility]    [employer] - Occupational Stress Questionnaire     Feeling of Stress : Only a little   Social Connections: Moderately Isolated (11/1/2023)    Received from [facility], [facility]    Social Connection and Isolation Panel [NHANES]     Frequency of Communication with Friends and Family: Twice a week     Frequency of Social Gatherings with Friends and Family: Once a week     Attends Evangelical Services: More than 4 times per year     Active Member of Clubs or Organizations: No     Attends Club or Organization Meetings: Never     Marital Status: Never    Intimate Partner Violence: Not At Risk (11/1/2023)    Received from [facility],  "[facility]    Humiliation, Afraid, Rape, and Kick questionnaire     Fear of Current or Ex-Partner: No     Emotionally Abused: No     Physically Abused: No     Sexually Abused: No   Housing Stability: Low Risk  (11/1/2023)    Received from [facility], [facility]    Housing Stability Vital Sign     Unable to Pay for Housing in the Last Year: No     Number of Places Lived in the Last Year: 1     Unstable Housing in the Last Year: No      Family History   Problem Relation Age of Onset    Hypertension Father     Diabetes Father      Past Surgical History:   Procedure Laterality Date    KNEE ARTHROSCOPY         Current Outpatient Medications:     amLODIPine (NORVASC) 10 mg tablet, Take 1 tablet (10 mg total) by mouth daily, Disp: 90 tablet, Rfl: 3    cholecalciferol (VITAMIN D3) 1,000 units tablet, Take 2,000 Units by mouth daily, Disp: , Rfl:     fenofibrate 160 MG tablet, Take 1 tablet (160 mg total) by mouth daily, Disp: 90 tablet, Rfl: 3    lisinopril (ZESTRIL) 10 mg tablet, Take 20 mg by mouth daily , Disp: , Rfl:     omeprazole (PriLOSEC) 20 mg delayed release capsule, Take 20 mg by mouth daily, Disp: , Rfl:   Allergies   Allergen Reactions    Sulfa Antibiotics      As a child       Labs:     Chemistry        Component Value Date/Time     03/09/2015 0849    K 4.7 07/29/2024 1134     07/29/2024 1134    CO2 28 07/29/2024 1134    BUN 20 07/29/2024 1134    CREATININE 1.06 07/29/2024 1134        Component Value Date/Time    CALCIUM 9.4 07/29/2024 1134    ALKPHOS 31 (L) 07/29/2024 1134    AST 24 07/29/2024 1134    ALT 32 07/29/2024 1134    BILITOT 0.3 03/09/2015 0849            No results found for: \"CHOL\"  Lab Results   Component Value Date    HDL 42 11/15/2023    HDL 39 (L) 02/25/2023    HDL 35 (L) 07/30/2022     Lab Results   Component Value Date    LDLCALC 79 11/15/2023    LDLCALC 90 02/25/2023    LDLCALC  07/30/2022      Comment:      Calculated LDL " "invalid, triglycerides >400 mg/dl  This screening LDL is a calculated result.   It does not have the accuracy of the Direct Measured LDL in the monitoring of patients with hyperlipidemia and/or statin therapy.   Direct Measure LDL (PBS648) must be ordered separately in these patients.     Lab Results   Component Value Date    TRIG 126 11/15/2023    TRIG 316 (H) 02/25/2023    TRIG 505 (H) 07/30/2022     No results found for: \"CHOLHDL\"    Imaging: No results found.    ECG: normal sinus rhythm      Review of Systems   Constitutional: Positive for malaise/fatigue.   Cardiovascular:  Positive for near-syncope.   Skin:  Positive for flushing.   All other systems reviewed and are negative.      Vitals:    10/23/24 1339   BP: 144/70   Pulse: 82   SpO2: 95%     Vitals:    10/23/24 1339   Weight: 82.7 kg (182 lb 6.4 oz)     Height: 5' 6\" (167.6 cm)   Body mass index is 29.44 kg/m².    Physical Exam:  Physical Exam  Vitals reviewed.   Constitutional:       General: He is not in acute distress.     Appearance: He is well-developed. He is not diaphoretic.   HENT:      Head: Normocephalic and atraumatic.   Eyes:      Pupils: Pupils are equal, round, and reactive to light.   Neck:      Vascular: No carotid bruit.   Cardiovascular:      Rate and Rhythm: Normal rate and regular rhythm.      Pulses:           Radial pulses are 2+ on the right side and 2+ on the left side.      Heart sounds: S1 normal and S2 normal. No murmur heard.  Pulmonary:      Effort: Pulmonary effort is normal. No respiratory distress.      Breath sounds: Normal breath sounds. No wheezing or rales.   Abdominal:      General: There is no distension.      Palpations: Abdomen is soft.      Tenderness: There is no abdominal tenderness.   Musculoskeletal:         General: Normal range of motion.      Cervical back: Normal range of motion.      Right lower leg: No edema.      Left lower leg: Edema (+1) present.   Skin:     General: Skin is warm and dry.      " Findings: No erythema.   Neurological:      General: No focal deficit present.      Mental Status: He is alert and oriented to person, place, and time.      Gait: Gait normal.   Psychiatric:         Mood and Affect: Mood normal.         Behavior: Behavior normal.

## 2024-10-23 ENCOUNTER — CLINICAL SUPPORT (OUTPATIENT)
Dept: CARDIOLOGY CLINIC | Facility: HOSPITAL | Age: 79
End: 2024-10-23
Payer: MEDICARE

## 2024-10-23 ENCOUNTER — OFFICE VISIT (OUTPATIENT)
Dept: CARDIOLOGY CLINIC | Facility: HOSPITAL | Age: 79
End: 2024-10-23
Payer: MEDICARE

## 2024-10-23 VITALS
OXYGEN SATURATION: 95 % | WEIGHT: 182.4 LBS | DIASTOLIC BLOOD PRESSURE: 70 MMHG | SYSTOLIC BLOOD PRESSURE: 144 MMHG | HEIGHT: 66 IN | HEART RATE: 82 BPM | BODY MASS INDEX: 29.32 KG/M2

## 2024-10-23 DIAGNOSIS — E78.5 DYSLIPIDEMIA: ICD-10-CM

## 2024-10-23 DIAGNOSIS — A69.20 LYME DISEASE: ICD-10-CM

## 2024-10-23 DIAGNOSIS — A69.20 LYME DISEASE: Primary | ICD-10-CM

## 2024-10-23 DIAGNOSIS — R55 NEAR SYNCOPE: ICD-10-CM

## 2024-10-23 DIAGNOSIS — I10 PRIMARY HYPERTENSION: Primary | ICD-10-CM

## 2024-10-23 PROCEDURE — 93000 ELECTROCARDIOGRAM COMPLETE: CPT | Performed by: PHYSICIAN ASSISTANT

## 2024-10-23 PROCEDURE — 99214 OFFICE O/P EST MOD 30 MIN: CPT | Performed by: PHYSICIAN ASSISTANT

## 2024-10-23 PROCEDURE — 93246 EXT ECG>7D<15D RECORDING: CPT | Performed by: INTERNAL MEDICINE

## 2024-10-23 NOTE — PATIENT INSTRUCTIONS
We will increase your lisinopril to one and a half tablets daily (30 mg) daily.  Continue your other medications.  In 1 week, get blood work done to check your potassium levels (do not need to fast)  When we get the results of your blood work we will call you and ask you how your blood pressure is  We will apply a heart monitor today that you will wear for 2 weeks  After the 2 week period, please get an echo (ultrasound) of your heart.

## 2024-10-30 ENCOUNTER — APPOINTMENT (OUTPATIENT)
Dept: LAB | Facility: HOSPITAL | Age: 79
End: 2024-10-30
Payer: MEDICARE

## 2024-10-30 ENCOUNTER — TELEPHONE (OUTPATIENT)
Dept: CARDIOLOGY CLINIC | Facility: HOSPITAL | Age: 79
End: 2024-10-30

## 2024-10-30 ENCOUNTER — TELEPHONE (OUTPATIENT)
Age: 79
End: 2024-10-30

## 2024-10-30 DIAGNOSIS — I10 PRIMARY HYPERTENSION: ICD-10-CM

## 2024-10-30 LAB
ANION GAP SERPL CALCULATED.3IONS-SCNC: 9 MMOL/L (ref 4–13)
BUN SERPL-MCNC: 27 MG/DL (ref 5–25)
CALCIUM SERPL-MCNC: 9.7 MG/DL (ref 8.4–10.2)
CHLORIDE SERPL-SCNC: 102 MMOL/L (ref 96–108)
CO2 SERPL-SCNC: 26 MMOL/L (ref 21–32)
CREAT SERPL-MCNC: 1.58 MG/DL (ref 0.6–1.3)
GFR SERPL CREATININE-BSD FRML MDRD: 41 ML/MIN/1.73SQ M
GLUCOSE SERPL-MCNC: 122 MG/DL (ref 65–140)
POTASSIUM SERPL-SCNC: 4.1 MMOL/L (ref 3.5–5.3)
SODIUM SERPL-SCNC: 137 MMOL/L (ref 135–147)

## 2024-10-30 PROCEDURE — 80048 BASIC METABOLIC PNL TOTAL CA: CPT

## 2024-10-30 PROCEDURE — 36415 COLL VENOUS BLD VENIPUNCTURE: CPT

## 2024-10-30 NOTE — TELEPHONE ENCOUNTER
Lvm for pt with results below and instructions and questions and asking pt to pls call office back so we can Spoke with him and discuss this.     In Basket message.     Message  Received: Today  Ania Andersen PA-C sent to  [facility]  Please call patient and let him know that his blood work shows that his kidney function has declined.  This could potentially due to the recent increase in his lisinopril dose.  I would like him to hold lisinopril completely for 1 day and then resume it at 20 mg which is how he was taking it previously.  How is his blood pressure been?  He will likely need another blood pressure medication since we are having to go back down on the lisinopril.   Associated Results     Contains abnormal data Basic metabolic panel  Order: [id number]   Status: Final result       Visible to patient: No (inaccessible [facility]       Dx: Primary hypertension    1 Result Note            Component  Ref Range & Units  9:33 AM  (10/30/24) 3 mo ago  (7/29/24) 7 mo ago  (3/15/24) 12 mo ago  (11/1/23) 1 yr ago  (1/27/23) 2 yr ago  (7/30/22) 2 yr ago  (1/10/22)   Sodium  135 - 147 mmol/L 137 140 R 141 R 139 R 140 138 140 R   Potassium  3.5 - 5.3 mmol/L 4.1 4.7 R 4.2 R 4.5 R 4.2 4.0 4.4   Chloride  96 - 108 mmol/L 102 102 R 107 R 103 R 108 104 106 R   CO2  21 - 32 mmol/L 26 28 R 27 R 29 R 24 27 27   ANION GAP  4 - 13 mmol/L 9 10 R 7 R 7 R 8 7 7   BUN  5 - 25 mg/dL 27 High  20 R 21 R 31 High  R 25 19 23   Creatinine  0.60 - 1.30 mg/dL 1.58 High  1.06 R 1.09 R 1.24 R 1.05 CM 1.14 CM 1.11 CM   Comment: Standardized to IDMS reference method   Glucose  65 - 140 mg/dL 122 112 High  R 90 R 109 High  R 107 CM  125 CM   Comment: If the patient is fasting, the ADA then defines impaired fasting glucose as > 100 mg/dL and diabetes as > or equal to 123 mg/dL.   Calcium  8.4 - 10.2 mg/dL 9.7 9.4 R 9.3 R 9.8 R 9.0 8.5 R 8.9 R   eGFR  ml/min/1.73sq m 41 72 R 70 R 60 R 69 63 65   Resulting Agency  [facility] (HNL1) [facility] (HNL1) [facility] ([facility]1) [facility]              Narrative  Performed by: [facility]  National Kidney Disease Foundation guidelines for Chronic Kidney Disease (CKD):    Stage 1 with normal or high GFR (GFR > 90 mL/min/1.73 square meters)    Stage 2 Mild CKD (GFR = 60-89 mL/min/1.73 square meters)    Stage 3A Moderate CKD (GFR = 45-59 mL/min/1.73 square meters)    Stage 3B Moderate CKD (GFR = 30-44 mL/min/1.73 square meters)    Stage 4 Severe CKD (GFR = 15-29 mL/min/1.73 square meters)    Stage 5 End Stage CKD (GFR <15 mL/min/1.73 square meters)  Note: GFR calculation is accurate only with a steady state creatinine      Specimen Collected: 10/30/24  9:33 AM Last Resulted: 10/30/24 10:25 AM

## 2024-10-30 NOTE — TELEPHONE ENCOUNTER
Pt returned phone call in regard to blood work. Relayed message about declining renal function. Instructed to hold the lisinopril today and to restart the medication at 20mg tomorrow as detailed in below message     The pt reported blood pressures of 138/70, 69 and 135/74,66. He will continue to monitor and report any increase in BP with the lowered dose.     Please call patient back with any further instruction.

## 2024-10-30 NOTE — TELEPHONE ENCOUNTER
Please call patient and let him know that his blood work shows that his kidney function has declined.  This could potentially due to the recent increase in his lisinopril dose.  I would like him to hold lisinopril completely for 1 day and then resume it at 20 mg which is how he was taking it previously.  How is his blood pressure been?  He will likely need another blood pressure medication since we are having to go back down on the lisinopril.    Received call from Inés and went over the above message, he verbally understood to hold the lisinopril today, and restart at 20 mg daily.     Inés stated that BP has been ranging around S -150 DBP 63-73   Today /71 HR 68    Please advise

## 2024-10-31 DIAGNOSIS — N17.9 AKI (ACUTE KIDNEY INJURY) (HCC): Primary | ICD-10-CM

## 2024-10-31 NOTE — TELEPHONE ENCOUNTER
I agree with the instructions - he should call us in 1 week with updated BP readings and if BP is elevated then we will need to add a new medication.  Can we also let him know that I would like him to have repeat blood work in 1-2 weeks - non fasting.   Thank you.

## 2024-11-07 ENCOUNTER — TELEPHONE (OUTPATIENT)
Age: 79
End: 2024-11-07

## 2024-11-07 DIAGNOSIS — I10 PRIMARY HYPERTENSION: Primary | ICD-10-CM

## 2024-11-07 RX ORDER — DOXAZOSIN 1 MG/1
1 TABLET ORAL
Qty: 30 TABLET | Refills: 6 | Status: SHIPPED | OUTPATIENT
Start: 2024-11-07

## 2024-11-07 NOTE — TELEPHONE ENCOUNTER
Please call patient - his blood pressures are overall running a bit higher than we would like.     We will need to add an extra blood pressure medication as he is on the maximum dose amlodipine and we were not able to increase his lisinopril due to the effects on his kidneys.    If he is agreeable to starting another blood pressure medication - we can try Cardura 1 mg that he would take at bedtime.     If he is eating a lot of salt, his blood pressure may improve with just cutting back on his salt intake. Please let me know what he would like to do.    Please also remind him to have his blood work done so we can re-evaluate his kidney function.    Thank you!

## 2024-11-07 NOTE — TELEPHONE ENCOUNTER
Caller: Patient       Doctor: Dr. Sinclair      Reason for call: Pt calling with BP readings for the week. As directed by Ania Andersen PA-C     10/31- 137/70 - 8am   10/31- 145/71 - 10am  10/31-  140/72- 4:30 pm    11/1- 133/62 - 8am   11/1-  136/62 - 3pm  11/1-  138/60 - 10pm    11/2-  149/75- 8am  11/2-  142/66- 11am  11/2-  138/85 - 8pm    11/3- 146/72- 6:30 am  11/3- 137/70- 7am  11/3- 139/74 - 3pm    11/4- 142/76- 7:30 am  11/4- 159/76- 9pm    11/5- 140/71- 8am  11/5- 140/65- 7:30 pm    11/6-  145/74- 7:30 am  11/6-   141/64- 2pm    11/7- 135/67- 7am  11/7- 148/73 - 7:15 am  11/7- 137/73-  7:30 am      Call back#: [phone]

## 2024-11-07 NOTE — TELEPHONE ENCOUNTER
I will send the medication to his pharmacy.    He should continue taking his other medications including his amlodipine 10 mg for his blood pressure and his lisinopril at 20 mg daily. We had increased it to 30 mg daily and it harmed his kidneys so I advised him to go back down to 20 mg daily last week.    He should continue to keep track of his blood pressures.     We will be in contact with him based on results of his echo and blood work.    Please let me know if he has any other questions.

## 2024-11-11 ENCOUNTER — OFFICE VISIT (OUTPATIENT)
Dept: OBGYN CLINIC | Facility: CLINIC | Age: 79
End: 2024-11-11
Payer: MEDICARE

## 2024-11-11 VITALS
HEIGHT: 66 IN | HEART RATE: 86 BPM | WEIGHT: 182 LBS | SYSTOLIC BLOOD PRESSURE: 153 MMHG | BODY MASS INDEX: 29.25 KG/M2 | DIASTOLIC BLOOD PRESSURE: 72 MMHG

## 2024-11-11 DIAGNOSIS — M17.12 PRIMARY OSTEOARTHRITIS OF LEFT KNEE: Primary | ICD-10-CM

## 2024-11-11 PROCEDURE — 99213 OFFICE O/P EST LOW 20 MIN: CPT | Performed by: ORTHOPAEDIC SURGERY

## 2024-11-11 NOTE — PROGRESS NOTES
Assessment/Plan:   Diagnoses and all orders for this visit:    Primary osteoarthritis of left knee  -     Injection Procedure Prior Authorization; Future    The patient has a history of osteoarthritis. He previously tried cortisone injections, which did not provide relief. Discussed treatment options with the patient at time of visit. The patient would like to try visco supplementation injections. Pre-authorization was ordered at time of visit. He will follow-up once visco injections are approved. The patient expresses understanding and is in agreement with today's treatment plan.     The patient has osteoarthritis of her left knee.  Patient feels the injection did not last for extended period time with the cortisone.  The next logical step would be viscosupplementation.  The order was placed.  Return back once approved      Subjective:   Patient ID: Inés Frey  1945     HPI  Patient is a 78 y.o. male who presents for follow-up evaluation of his Left knee(s). The patient was last seen on 8/12/2024, where he received a CS injection for treatment of osteoarthritis of his Left knee(s). The patient reports minimal relief following the previous CS injection. On today's presentation, he reports pain along the medial and lateral aspect of his knee(s). He states that the pain is exacerbated by weight bearing activities, knee flexion, and ambulating stairs. He also reports pain when walking on uneven surfaces. He rates his pain as a 7/10. He denies feelings of instability or weakness. He denies numbness or tingling.        The following portions of the patient's history were reviewed and updated as appropriate:  Past medical history, past surgical history, Family history, social history, current medications and allergies    Past Medical History:   Diagnosis Date    GERD (gastroesophageal reflux disease)     Hypertension        Past Surgical History:   Procedure Laterality Date    KNEE ARTHROSCOPY         Family  History   Problem Relation Age of Onset    Hypertension Father     Diabetes Father        Social History     Socioeconomic History    Marital status: Single     Spouse name: None    Number of children: None    Years of education: None    Highest education level: None   Occupational History    None   Tobacco Use    Smoking status: Never     Passive exposure: Never    Smokeless tobacco: Never   Vaping Use    Vaping status: Never Used   Substance and Sexual Activity    Alcohol use: Yes     Comment: 1-2 drinks a month    Drug use: Never    Sexual activity: Not Currently   Other Topics Concern    None   Social History Narrative    Caffeine use- coffee on occasion     Social Determinants of Health     Financial Resource Strain: Low Risk  (11/4/2024)    Received from [facility] [address]    Overall Financial Resource Strain (CARDIA)     Difficulty of Paying Living Expenses: Not hard at all   Food Insecurity: No Food Insecurity (11/4/2024)    Received from [facility] [address]    Hunger Vital Sign     Worried About Running Out of Food in the Last Year: Never true     Ran Out of Food in the Last Year: Never true   Transportation Needs: No Transportation Needs (11/4/2024)    Received from [facility] [address]    PRAPARE - Transportation     Lack of Transportation (Medical): No     Lack of Transportation (Non-Medical): No   Physical Activity: Inactive (11/1/2023)    Received from [facility] [address]    Exercise Vital Sign     Days of Exercise per Week: 0 days     Minutes of Exercise per Session: 0 min   Stress: No Stress Concern Present (11/4/2024)    Received from [facility] [address]    Citizen of [place] of Occupational Health - Occupational Stress Questionnaire     Feeling of Stress : Only a little   Social Connections: Feeling Socially Integrated (11/4/2024)    Received from [facility] [address]    OASIS : Social Isolation     How often do you feel lonely or isolated  "from those around you?: Rarely   Intimate Partner Violence: Not At Risk (11/4/2024)    Received from [facility]    Humiliation, Afraid, Rape, and Kick questionnaire     Fear of Current or Ex-Partner: No     Emotionally Abused: No     Physically Abused: No     Sexually Abused: No   Housing Stability: Low Risk  (11/4/2024)    Received from [facility]    Housing Stability Vital Sign     Unable to Pay for Housing in the Last Year: No     Number of Times Moved in the Last Year: 0     Homeless in the Last Year: No         Current Outpatient Medications:     amLODIPine (NORVASC) 10 mg tablet, Take 1 tablet (10 mg total) by mouth daily, Disp: 90 tablet, Rfl: 3    cholecalciferol (VITAMIN D3) 1,000 units tablet, Take 2,000 Units by mouth daily, Disp: , Rfl:     doxazosin (CARDURA) 1 mg tablet, Take 1 tablet (1 mg total) by mouth daily at bedtime, Disp: 30 tablet, Rfl: 6    fenofibrate 160 MG tablet, Take 1 tablet (160 mg total) by mouth daily, Disp: 90 tablet, Rfl: 3    lisinopril (ZESTRIL) 10 mg tablet, Take 20 mg by mouth daily , Disp: , Rfl:     omeprazole (PriLOSEC) 20 mg delayed release capsule, Take 20 mg by mouth daily, Disp: , Rfl:     Allergies   Allergen Reactions    Sulfa Antibiotics      As a child       Review of Systems     Objective:  /72 (BP Location: Left arm, Patient Position: Sitting, Cuff Size: Large)   Pulse 86   Ht 5' 6\" (1.676 m)   Wt 82.6 kg (182 lb) Comment: reported  BMI 29.38 kg/m²     Ortho Exam  left knee(s) -   Patient ambulates with steady gait pattern  Uses No assistive device  No anatomical deformity  Skin is warm and dry to touch with no signs of erythema, ecchymosis, or infection   No soft tissue swelling or effusion noted  ROM (0° - 115°)   Strength: 5/5 throughout  TTP over medial joint line, TTP over lateral joint line, TTP over pes anserine bursa, no popliteal fullness appreciated on exam   Flexor and extensor mechanisms are intact   Knee is " stable to varus and valgus stress  - Lachman's  - Anterior Drawer, - Posterior Drawer  - Ry's  Patella tracks centrally with palpable crepitus  Calf compartments are soft and supple  - Sanya's sign  2+ DP and PT pulses with brisk capillary refill to the toes  Sural, saphenous, tibial, superficial, and deep peroneal motor and sensory distributions intact  Sensation light touch intact distally      Physical Exam     Diagnostic Test Review:  No new imaging at time of visit.     Procedures   None performed.     Scribe Attestation      I,:  Tiffany Christianson am acting as a scribe while in the presence of the attending physician.:       I,:  Barrington Rivas, DO personally performed the services described in this documentation    as scribed in my presence.:

## 2024-11-13 ENCOUNTER — RESULTS FOLLOW-UP (OUTPATIENT)
Dept: CARDIOLOGY CLINIC | Facility: HOSPITAL | Age: 79
End: 2024-11-13

## 2024-11-13 ENCOUNTER — HOSPITAL ENCOUNTER (OUTPATIENT)
Dept: NON INVASIVE DIAGNOSTICS | Facility: HOSPITAL | Age: 79
Discharge: HOME | End: 2024-11-13
Payer: MEDICARE

## 2024-11-13 ENCOUNTER — APPOINTMENT (OUTPATIENT)
Dept: LAB | Facility: HOSPITAL | Age: 79
End: 2024-11-13
Payer: MEDICARE

## 2024-11-13 ENCOUNTER — CLINICAL SUPPORT (OUTPATIENT)
Dept: CARDIOLOGY CLINIC | Facility: HOSPITAL | Age: 79
End: 2024-11-13
Payer: MEDICARE

## 2024-11-13 VITALS
BODY MASS INDEX: 29.25 KG/M2 | HEIGHT: 66 IN | HEART RATE: 86 BPM | WEIGHT: 182 LBS | SYSTOLIC BLOOD PRESSURE: 153 MMHG | DIASTOLIC BLOOD PRESSURE: 72 MMHG

## 2024-11-13 DIAGNOSIS — R55 NEAR SYNCOPE: ICD-10-CM

## 2024-11-13 DIAGNOSIS — E78.1 PURE HYPERGLYCERIDEMIA: ICD-10-CM

## 2024-11-13 DIAGNOSIS — R73.02 IMPAIRED GLUCOSE TOLERANCE: ICD-10-CM

## 2024-11-13 DIAGNOSIS — A69.20 LYME DISEASE: ICD-10-CM

## 2024-11-13 DIAGNOSIS — N17.9 AKI (ACUTE KIDNEY INJURY) (HCC): ICD-10-CM

## 2024-11-13 LAB
ANION GAP SERPL CALCULATED.3IONS-SCNC: 7 MMOL/L (ref 4–13)
AORTIC ROOT: 3.4 CM
AORTIC VALVE MEAN VELOCITY: 6.3 M/S
APICAL FOUR CHAMBER EJECTION FRACTION: 54 %
ASCENDING AORTA: 3.4 CM
AV LVOT MEAN GRADIENT: 1 MMHG
AV LVOT PEAK GRADIENT: 3 MMHG
AV MEAN GRADIENT: 2 MMHG
AV PEAK GRADIENT: 4 MMHG
AV VELOCITY RATIO: 0.86
BSA FOR ECHO PROCEDURE: 1.92 M2
BUN SERPL-MCNC: 20 MG/DL (ref 5–25)
CALCIUM SERPL-MCNC: 9.2 MG/DL (ref 8.4–10.2)
CHLORIDE SERPL-SCNC: 102 MMOL/L (ref 96–108)
CHOLEST SERPL-MCNC: 151 MG/DL (ref ?–200)
CO2 SERPL-SCNC: 29 MMOL/L (ref 21–32)
CREAT SERPL-MCNC: 1.09 MG/DL (ref 0.6–1.3)
DOP CALC AO PEAK VEL: 0.99 M/S
DOP CALC AO VTI: 24.77 CM
DOP CALC LVOT PEAK VEL VTI: 18.97 CM
DOP CALC LVOT PEAK VEL: 0.85 M/S
E WAVE DECELERATION TIME: 198 MS
E/A RATIO: 1.19
EST. AVERAGE GLUCOSE BLD GHB EST-MCNC: 128 MG/DL
FRACTIONAL SHORTENING: 36 (ref 28–44)
GFR SERPL CREATININE-BSD FRML MDRD: 65 ML/MIN/1.73SQ M
GLUCOSE P FAST SERPL-MCNC: 121 MG/DL (ref 65–99)
HBA1C MFR BLD: 6.1 %
HDLC SERPL-MCNC: 38 MG/DL
INTERVENTRICULAR SEPTUM IN DIASTOLE (PARASTERNAL SHORT AXIS VIEW): 1 CM
INTERVENTRICULAR SEPTUM: 1 CM (ref 0.6–1.1)
LAAS-AP2: 11.7 CM2
LAAS-AP4: 17.6 CM2
LDLC SERPL CALC-MCNC: 77 MG/DL (ref 0–100)
LEFT ATRIUM SIZE: 3.8 CM
LEFT ATRIUM VOLUME (MOD BIPLANE): 38 ML
LEFT ATRIUM VOLUME INDEX (MOD BIPLANE): 19.8 ML/M2
LEFT INTERNAL DIMENSION IN SYSTOLE: 2.8 CM (ref 2.1–4)
LEFT VENTRICULAR INTERNAL DIMENSION IN DIASTOLE: 4.4 CM (ref 3.5–6)
LEFT VENTRICULAR POSTERIOR WALL IN END DIASTOLE: 0.9 CM
LEFT VENTRICULAR STROKE VOLUME: 60 ML
LVSV (TEICH): 60 ML
MV E'TISSUE VEL-LAT: 13 CM/S
MV E'TISSUE VEL-SEP: 10 CM/S
MV PEAK A VEL: 0.75 M/S
MV PEAK E VEL: 89 CM/S
MV STENOSIS PRESSURE HALF TIME: 57 MS
MV VALVE AREA P 1/2 METHOD: 3.86
NONHDLC SERPL-MCNC: 113 MG/DL
POTASSIUM SERPL-SCNC: 3.8 MMOL/L (ref 3.5–5.3)
RA PRESSURE ESTIMATED: 8 MMHG
RIGHT ATRIUM AREA SYSTOLE A4C: 11.9 CM2
RIGHT VENTRICLE ID DIMENSION: 3.3 CM
SL CV LEFT ATRIUM LENGTH A2C: 4.4 CM
SL CV LV EF: 60
SL CV PED ECHO LEFT VENTRICLE DIASTOLIC VOLUME (MOD BIPLANE) 2D: 89 ML
SL CV PED ECHO LEFT VENTRICLE SYSTOLIC VOLUME (MOD BIPLANE) 2D: 28 ML
SODIUM SERPL-SCNC: 138 MMOL/L (ref 135–147)
TRICUSPID ANNULAR PLANE SYSTOLIC EXCURSION: 2.6 CM
TRIGL SERPL-MCNC: 180 MG/DL (ref ?–150)

## 2024-11-13 PROCEDURE — 93306 TTE W/DOPPLER COMPLETE: CPT

## 2024-11-13 PROCEDURE — 80061 LIPID PANEL: CPT

## 2024-11-13 PROCEDURE — 80048 BASIC METABOLIC PNL TOTAL CA: CPT

## 2024-11-13 PROCEDURE — 93248 EXT ECG>7D<15D REV&INTERPJ: CPT | Performed by: INTERNAL MEDICINE

## 2024-11-13 PROCEDURE — 36415 COLL VENOUS BLD VENIPUNCTURE: CPT

## 2024-11-13 PROCEDURE — 93306 TTE W/DOPPLER COMPLETE: CPT | Performed by: INTERNAL MEDICINE

## 2024-11-13 PROCEDURE — 83036 HEMOGLOBIN GLYCOSYLATED A1C: CPT

## 2024-11-14 ENCOUNTER — EVENT RECORDER/EXTENDED HOLTER (OUTPATIENT)
Dept: NON INVASIVE DIAGNOSTICS | Facility: HOSPITAL | Age: 79
End: 2024-11-14

## 2024-11-14 NOTE — PROGRESS NOTES
Results of cardiac rhythm monitoring-ZIO XT monitor    Duration of monitoring-14 days-October-November 2024    Basic data:  Patient had a min HR of 47 bpm, max HR of 153 bpm, and avg HR of 76 bpm.   Predominant underlying rhythm was Sinus Rhythm.   Slight P wave morphology changes were noted.     Supraventricular ectopy:   1 run of Supraventricular Tachycardia occurred lasting 19 beats with a max rate of 130 bpm (avg 118 bpm).   Isolated SVEs were rare (<1.0%), SVE Couplets were rare (<1.0%), and SVE Triplets were rare (<1.0%).      Ventricular ectopy:  Isolated VEs were rare (<1.0%), VE Couplets were rare (<1.0%), and no VE Triplets were present.    Atrial fibrillation:  None present     Symptoms:   Multiple symptom episodes-correlated sinus rhythm with isolated PACs only, no significant arrhythmias were present.      Impression:  Overall unremarkable 14 days of cardiac rhythm monitoring  No significant arrhythmias were present  Minimal supraventricular ectopy and minimal ventricular ectopy  No atrial fibrillation or flutter  Symptoms consistently correlated with isolated PACs

## 2024-11-25 ENCOUNTER — PROCEDURE VISIT (OUTPATIENT)
Dept: OBGYN CLINIC | Facility: CLINIC | Age: 79
End: 2024-11-25
Payer: MEDICARE

## 2024-11-25 VITALS
DIASTOLIC BLOOD PRESSURE: 65 MMHG | BODY MASS INDEX: 29.25 KG/M2 | SYSTOLIC BLOOD PRESSURE: 163 MMHG | WEIGHT: 182 LBS | HEART RATE: 83 BPM | HEIGHT: 66 IN

## 2024-11-25 DIAGNOSIS — M17.12 PRIMARY OSTEOARTHRITIS OF LEFT KNEE: Primary | ICD-10-CM

## 2024-11-25 PROCEDURE — 20610 DRAIN/INJ JOINT/BURSA W/O US: CPT | Performed by: ORTHOPAEDIC SURGERY

## 2024-11-25 PROCEDURE — 99213 OFFICE O/P EST LOW 20 MIN: CPT | Performed by: ORTHOPAEDIC SURGERY

## 2024-11-25 RX ORDER — TRIAMCINOLONE ACETONIDE 40 MG/ML
80 INJECTION, SUSPENSION INTRA-ARTICULAR; INTRAMUSCULAR
Status: COMPLETED | OUTPATIENT
Start: 2024-11-25 | End: 2024-11-25

## 2024-11-25 RX ORDER — BUPIVACAINE HYDROCHLORIDE 2.5 MG/ML
4 INJECTION, SOLUTION INFILTRATION; PERINEURAL
Status: COMPLETED | OUTPATIENT
Start: 2024-11-25 | End: 2024-11-25

## 2024-11-25 RX ADMIN — TRIAMCINOLONE ACETONIDE 80 MG: 40 INJECTION, SUSPENSION INTRA-ARTICULAR; INTRAMUSCULAR at 11:00

## 2024-11-25 RX ADMIN — BUPIVACAINE HYDROCHLORIDE 4 ML: 2.5 INJECTION, SOLUTION INFILTRATION; PERINEURAL at 11:00

## 2024-11-25 NOTE — PROGRESS NOTES
ASSESSMENT/PLAN:    Diagnoses and all orders for this visit:    Primary osteoarthritis of left knee    Other orders  -     Large joint arthrocentesis        The patient's left knee was injected with his first set of Orthovisc.  He tolerated the injection quite well.  Follow-up with our office next week for his second set.  The patient is acceptable to this plan.    Return in about 1 week (around 12/2/2024).    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  Under aseptic technique, the left knee was injected with his first set of Orthovisc.  He tolerated the procedure well.  Return back next week for second set of injections      _____________________________________________________  CHIEF COMPLAINT:  Chief Complaint   Patient presents with    Left Knee - Follow-up     Orthovisc #1 Buy and Bill         SUBJECTIVE:  Inés Frey is a 78 y.o. male who presents to our office for viscosupplementation of his left knee.  He is here today to begin Orthovisc injections. Patient has failed at least three months of conservative therapy including corticosteroid injections and a home exercise program , patient symptoms include left knee pain and ambulatory dysfunction which are affecting ADLs, pain is rated at 8/10.      The following portions of the patient's history were reviewed and updated as appropriate: allergies, current medications, past family history, past medical history, past social history, past surgical history and problem list.    PAST MEDICAL HISTORY:  Past Medical History:   Diagnosis Date    GERD (gastroesophageal reflux disease)     Hypertension        PAST SURGICAL HISTORY:  Past Surgical History:   Procedure Laterality Date    KNEE ARTHROSCOPY         FAMILY HISTORY:  Family History   Problem Relation Age of Onset    Hypertension Father     Diabetes Father        SOCIAL HISTORY:  Social History     Tobacco Use    Smoking status: Never     Passive exposure: Never    Smokeless tobacco: Never  "  Vaping Use    Vaping status: Never Used   Substance Use Topics    Alcohol use: Yes     Comment: 1-2 drinks a month    Drug use: Never       MEDICATIONS:    Current Outpatient Medications:     amLODIPine (NORVASC) 10 mg tablet, Take 1 tablet (10 mg total) by mouth daily, Disp: 90 tablet, Rfl: 3    cholecalciferol (VITAMIN D3) 1,000 units tablet, Take 2,000 Units by mouth daily, Disp: , Rfl:     doxazosin (CARDURA) 1 mg tablet, Take 1 tablet (1 mg total) by mouth daily at bedtime, Disp: 30 tablet, Rfl: 6    fenofibrate 160 MG tablet, Take 1 tablet (160 mg total) by mouth daily, Disp: 90 tablet, Rfl: 3    lisinopril (ZESTRIL) 10 mg tablet, Take 20 mg by mouth daily , Disp: , Rfl:     omeprazole (PriLOSEC) 20 mg delayed release capsule, Take 20 mg by mouth daily, Disp: , Rfl:     ALLERGIES:  Allergies   Allergen Reactions    Sulfa Antibiotics      As a child       ROS:  Review of Systems     Constitutional: Negative for fatigue, fever or loss of appetite.   HENT: Negative.    Respiratory: Negative for shortness of breath, dyspnea.    Cardiovascular: Negative for chest pain/tightness.   Gastrointestinal: Negative for abdominal pain, N/V.   Endocrine: Negative for cold/heat intolerance, unexplained weight loss/gain.   Genitourinary: Negative for flank pain, dysuria, hematuria.   Musculoskeletal: Positive for arthralgia   Skin: Negative for rash.    Neurological: Negative for numbness or tingling  Psychiatric/Behavioral: Negative for agitation.  _____________________________________________________  PHYSICAL EXAMINATION:    Blood pressure 163/65, pulse 83, height 5' 6\" (1.676 m), weight 82.6 kg (182 lb).    Constitutional: Oriented to person, place, and time. Appears well-developed and well-nourished. No distress.   HENT:   Head: Normocephalic.   Eyes: Conjunctivae are normal. Right eye exhibits no discharge. Left eye exhibits no discharge. No scleral icterus.   Cardiovascular: Normal rate.    Pulmonary/Chest: Effort " "normal.   Neurological: Alert and oriented to person, place, and time.   Skin: Skin is warm and dry. No rash noted. Not diaphoretic. No erythema. No pallor.   Psychiatric: Normal mood and affect. Behavior is normal. Judgment and thought content normal.      MUSCULOSKELETAL EXAMINATION:   Physical Exam  Ortho Exam    Left lower extremity is neurovascularly intact  Toes are pink and mobile   Compartments are soft  No warmth, erythema or ecchymosis  ROM of knee is from 5-115 degrees  Negative Lachman, drawer or pivot shift  No medial instability  Medial joint line tenderness, slight lateral joint line tenderness  Patellofemoral crepitation   Objective:  BP Readings from Last 1 Encounters:   11/25/24 163/65      Wt Readings from Last 1 Encounters:   11/25/24 82.6 kg (182 lb)        BMI:   Estimated body mass index is 29.38 kg/m² as calculated from the following:    Height as of this encounter: 5' 6\" (1.676 m).    Weight as of this encounter: 82.6 kg (182 lb).      PROCEDURES PERFORMED:  Large joint arthrocentesis: L knee  Universal Protocol:  Risks and benefits: risks, benefits and alternatives were discussed  Consent given by: patient  Patient understanding: patient states understanding of the procedure being performed  Site marked: the operative site was marked  Patient identity confirmed: verbally with patient  Supporting Documentation  Indications: pain   Procedure Details  Location: knee - L knee  Preparation: Patient was prepped and draped in the usual sterile fashion  Needle size: 22 G  Ultrasound guidance: no  Approach: lateral  Medications administered: 4 mL bupivacaine 0.25 %; 80 mg triamcinolone acetonide 40 mg/mL    Patient tolerance: patient tolerated the procedure well with no immediate complications  Dressing:  Sterile dressing applied            Scribe Attestation      I,:  Willard Alonzo PA-C am acting as a scribe while in the presence of the attending physician.:       I,:  Barrington Rivas, " DO personally performed the services described in this documentation    as scribed in my presence.:

## 2024-12-06 ENCOUNTER — PROCEDURE VISIT (OUTPATIENT)
Dept: OBGYN CLINIC | Facility: CLINIC | Age: 79
End: 2024-12-06
Payer: MEDICARE

## 2024-12-06 VITALS
SYSTOLIC BLOOD PRESSURE: 142 MMHG | HEIGHT: 66 IN | HEART RATE: 84 BPM | BODY MASS INDEX: 29.25 KG/M2 | DIASTOLIC BLOOD PRESSURE: 67 MMHG | WEIGHT: 182 LBS

## 2024-12-06 DIAGNOSIS — M17.12 PRIMARY OSTEOARTHRITIS OF LEFT KNEE: Primary | ICD-10-CM

## 2024-12-06 PROCEDURE — 20610 DRAIN/INJ JOINT/BURSA W/O US: CPT | Performed by: ORTHOPAEDIC SURGERY

## 2024-12-06 NOTE — PROGRESS NOTES
ASSESSMENT/PLAN:    Diagnoses and all orders for this visit:    Primary osteoarthritis of left knee    Other orders  -     Large joint arthrocentesis        The patient's left knee was injected with his second set of Orthovisc.  He tolerated the injection quite well.  He will follow-up next week for his third and final set.  He is acceptable to this plan.    Return in about 1 week (around 12/13/2024).    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  Under aseptic technique, the left knee was injected with a second set of Orthovisc.  He tolerated procedure quite well.  Return back next week for his final set of injections      _____________________________________________________  CHIEF COMPLAINT:  Chief Complaint   Patient presents with    Left Knee - Follow-up     Orthovisc #2 Buy and Bill         SUBJECTIVE:  Inés Frey is a 78 y.o. male who presents to our office for viscosupplementation of his left knee.  He is here today for second set of Orthovisc.  He tolerated his previous injection without issue.  He denies any numbness or tingling.  He denies any fever or chills.    The following portions of the patient's history were reviewed and updated as appropriate: allergies, current medications, past family history, past medical history, past social history, past surgical history and problem list.    PAST MEDICAL HISTORY:  Past Medical History:   Diagnosis Date    GERD (gastroesophageal reflux disease)     Hypertension        PAST SURGICAL HISTORY:  Past Surgical History:   Procedure Laterality Date    KNEE ARTHROSCOPY         FAMILY HISTORY:  Family History   Problem Relation Age of Onset    Hypertension Father     Diabetes Father        SOCIAL HISTORY:  Social History     Tobacco Use    Smoking status: Never     Passive exposure: Never    Smokeless tobacco: Never   Vaping Use    Vaping status: Never Used   Substance Use Topics    Alcohol use: Yes     Comment: 1-2 drinks a month    Drug use:  "Never       MEDICATIONS:    Current Outpatient Medications:     amLODIPine (NORVASC) 10 mg tablet, Take 1 tablet (10 mg total) by mouth daily, Disp: 90 tablet, Rfl: 3    cholecalciferol (VITAMIN D3) 1,000 units tablet, Take 2,000 Units by mouth daily, Disp: , Rfl:     doxazosin (CARDURA) 1 mg tablet, Take 1 tablet (1 mg total) by mouth daily at bedtime, Disp: 30 tablet, Rfl: 6    fenofibrate 160 MG tablet, Take 1 tablet (160 mg total) by mouth daily, Disp: 90 tablet, Rfl: 3    lisinopril (ZESTRIL) 10 mg tablet, Take 20 mg by mouth daily , Disp: , Rfl:     omeprazole (PriLOSEC) 20 mg delayed release capsule, Take 20 mg by mouth daily, Disp: , Rfl:     ALLERGIES:  Allergies   Allergen Reactions    Sulfa Antibiotics      As a child       ROS:  Review of Systems     Constitutional: Negative for fatigue, fever or loss of appetite.   HENT: Negative.    Respiratory: Negative for shortness of breath, dyspnea.    Cardiovascular: Negative for chest pain/tightness.   Gastrointestinal: Negative for abdominal pain, N/V.   Endocrine: Negative for cold/heat intolerance, unexplained weight loss/gain.   Genitourinary: Negative for flank pain, dysuria, hematuria.   Musculoskeletal: Positive for arthralgia   Skin: Negative for rash.    Neurological: Negative for numbness or tingling  Psychiatric/Behavioral: Negative for agitation.  _____________________________________________________  PHYSICAL EXAMINATION:    Blood pressure 142/67, pulse 84, height 5' 6\" (1.676 m), weight 82.6 kg (182 lb).    Constitutional: Oriented to person, place, and time. Appears well-developed and well-nourished. No distress.   HENT:   Head: Normocephalic.   Eyes: Conjunctivae are normal. Right eye exhibits no discharge. Left eye exhibits no discharge. No scleral icterus.   Cardiovascular: Normal rate.    Pulmonary/Chest: Effort normal.   Neurological: Alert and oriented to person, place, and time.   Skin: Skin is warm and dry. No rash noted. Not diaphoretic. " "No erythema. No pallor.   Psychiatric: Normal mood and affect. Behavior is normal. Judgment and thought content normal.      MUSCULOSKELETAL EXAMINATION:   Physical Exam  Ortho Exam    Left lower extremity is neurovascularly intact  Toes are pink and mobile   Compartments are soft  No warmth, erythema or ecchymosis  ROM of knee is from 5-115 degrees  Negative Lachman, drawer or pivot shift  No medial instability  Medial joint line tenderness, slight lateral joint line tenderness  Patellofemoral crepitation   Objective:  BP Readings from Last 1 Encounters:   12/06/24 142/67      Wt Readings from Last 1 Encounters:   12/06/24 82.6 kg (182 lb)        BMI:   Estimated body mass index is 29.38 kg/m² as calculated from the following:    Height as of this encounter: 5' 6\" (1.676 m).    Weight as of this encounter: 82.6 kg (182 lb).      PROCEDURES PERFORMED:  Large joint arthrocentesis: L knee  Universal Protocol:  Risks and benefits: risks, benefits and alternatives were discussed  Consent given by: patient  Patient understanding: patient states understanding of the procedure being performed  Site marked: the operative site was marked  Patient identity confirmed: verbally with patient  Supporting Documentation  Indications: pain   Procedure Details  Location: knee - L knee  Preparation: Patient was prepped and draped in the usual sterile fashion  Needle size: 22 G  Ultrasound guidance: no  Approach: lateral  Medications administered: 30 mg sodium hyaluronate 30 mg/2 mL    Patient tolerance: patient tolerated the procedure well with no immediate complications  Dressing:  Sterile dressing applied            Scribe Attestation      I,:  Willard Alonzo PA-C am acting as a scribe while in the presence of the attending physician.:       I,:  Barrington Rivas DO personally performed the services described in this documentation    as scribed in my presence.:            "

## 2024-12-10 ENCOUNTER — TELEPHONE (OUTPATIENT)
Dept: CARDIOLOGY CLINIC | Facility: HOSPITAL | Age: 79
End: 2024-12-10

## 2024-12-10 ENCOUNTER — OFFICE VISIT (OUTPATIENT)
Dept: CARDIOLOGY CLINIC | Facility: HOSPITAL | Age: 79
End: 2024-12-10
Payer: MEDICARE

## 2024-12-10 VITALS
SYSTOLIC BLOOD PRESSURE: 134 MMHG | HEART RATE: 80 BPM | BODY MASS INDEX: 29.38 KG/M2 | HEIGHT: 66 IN | DIASTOLIC BLOOD PRESSURE: 60 MMHG | OXYGEN SATURATION: 95 %

## 2024-12-10 DIAGNOSIS — E78.2 MIXED DYSLIPIDEMIA: ICD-10-CM

## 2024-12-10 DIAGNOSIS — A69.20 LYME DISEASE: ICD-10-CM

## 2024-12-10 DIAGNOSIS — I10 HYPERTENSIVE RESPONSE TO EXERCISE: ICD-10-CM

## 2024-12-10 DIAGNOSIS — I10 PRIMARY HYPERTENSION: Primary | ICD-10-CM

## 2024-12-10 DIAGNOSIS — I10 HYPERTENSION, UNSPECIFIED TYPE: ICD-10-CM

## 2024-12-10 PROCEDURE — 99214 OFFICE O/P EST MOD 30 MIN: CPT | Performed by: INTERNAL MEDICINE

## 2024-12-10 RX ORDER — FENOFIBRATE 160 MG/1
160 TABLET ORAL DAILY
Qty: 90 TABLET | Refills: 3 | Status: SHIPPED | OUTPATIENT
Start: 2024-12-10

## 2024-12-10 RX ORDER — LISINOPRIL 20 MG/1
20 TABLET ORAL DAILY
Qty: 90 TABLET | Refills: 3 | Status: SHIPPED | OUTPATIENT
Start: 2024-12-10

## 2024-12-10 RX ORDER — AMLODIPINE BESYLATE 10 MG/1
10 TABLET ORAL DAILY
Qty: 90 TABLET | Refills: 3 | Status: SHIPPED | OUTPATIENT
Start: 2024-12-10

## 2024-12-10 NOTE — PROGRESS NOTES
Cardiology Follow Up    Inés Frey  1945  [id number]  [facility]  [address]  [place] [id number] [phone]    1. Primary hypertension        2. Mixed dyslipidemia        3. Lyme disease            Diagnoses and all orders for this visit:    Primary hypertension    Mixed dyslipidemia    Lyme disease      I had the pleasure of seeing Inés Frey for a follow up visit.     INTERVAL HISTORY: none, no further chest pains    History of the presenting illness, Discussion/Summary and My Plan are as follows:::    Inés is a pleasant 78-year-old gentleman with hypertension, mixed dyslipidemia, lifelong non-smoker, no alcohol use, fatty liver, family history of hypertension who is here for further evaluation of chest pains.     He had very atypical chest pains with negative evaluation in the ED, check an exercise treadmill stress test which brought on chest pains but with no ECG evidence of ischemia.  His chest pains have completely resolved and have not recurred and he remains physically very active.  In addition a nuclear stress test was negative in November 2023.     He also has mixed dyslipidemia, and was on niacin and fish oil, and with his triglycerides having improved to 316 in February 2023, discontinued his niacin and fish oil and started him on fenofibrate on which he is at this time.    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  No cardiac symptoms.  He underwent a 2-week cardiac rhythm monitor since he was having episodes of dizziness after having been treated for Lyme disease which he completed in November 2024 and was negative.     Plan:     Ischemic evaluation: Negative nuclear stress test in November 2023, negative ECG stress test in February 2023 but had chest pains.  Remains active without any cardiac symptoms     Hypertension: Controlled,  since his last visit, low-dose doxazosin and lisinopril 20 mg was added as well, had mild acute kidney injury on lisinopril at 30 mg.     Mixed dyslipidemia: Now on fenofibrate alone,   If his direct LDL/non-HDL remains over goal, might consider a statin, has impaired fasting glucose, continue same medication.  Dietary and lifestyle changes were discussed as well.,  Has gained a few pounds, about 12 pounds in the last year, A1C is creeping up ([facility])    Follow up in 12 mo     Latest Reference Range & Units 02/25/23 08:25 11/15/23 06:49 11/13/24 07:58   Cholesterol See Comment mg/dL 192 146 151   Triglycerides See Comment mg/dL 316 (H) 126 180 (H)   HDL >=40 mg/dL 39 (L) 42 38 (L)   Non-HDL Cholesterol mg/dl   113   LDL Calculated 0 - 100 mg/dL 90 79 77   (H): Data is abnormally high  (L): Data is abnormally low   Latest Reference Range & Units 02/27/15 06:24 07/30/22 08:30 11/01/23 09:16 11/13/24 07:58   Hemoglobin A1C Normal 4.0-5.6%; PreDiabetic 5.7-6.4%; Diabetic >=6.5%; Glycemic control for adults with diabetes <7.0% % 5.5 5.7 (H) 5.9 (H) (E) 6.1 (H)   (H): Data is abnormally high  (E): External lab result    Patient Active Problem List   Diagnosis    Fall    Closed fracture of multiple ribs of right side    Acute pain due to trauma    Abdominal distention    Vitamin D deficiency    Left inguinal hernia    Hypertension    BPH with urinary obstruction    Mixed dyslipidemia    Atypical chest pain    Abnormal ECG    Gastroesophageal reflux disease without esophagitis    Lyme disease    Pure hypertriglyceridemia     Past Medical History:   Diagnosis Date    GERD (gastroesophageal reflux disease)     Hypertension      Social History     Socioeconomic History    Marital status: Single     Spouse name: Not on file    Number of children: Not on file    Years of education: Not on file    Highest education level: Not on file   Occupational History    Not on file   Tobacco Use    Smoking status: Never     Passive exposure:  Never    Smokeless tobacco: Never   Vaping Use    Vaping status: Never Used   Substance and Sexual Activity    Alcohol use: Yes     Comment: 1-2 drinks a month    Drug use: Never    Sexual activity: Not Currently   Other Topics Concern    Not on file   Social History Narrative    Caffeine use- coffee on occasion     Social Drivers of Health     Financial Resource Strain: Low Risk  (11/4/2024)    Received from [facility]    Overall Financial Resource Strain (CARDIA)     Difficulty of Paying Living Expenses: Not hard at all   Food Insecurity: No Food Insecurity (11/4/2024)    Received from [facility]    Hunger Vital Sign     Worried About Running Out of Food in the Last Year: Never true     Ran Out of Food in the Last Year: Never true   Transportation Needs: No Transportation Needs (11/4/2024)    Received from [facility]    PRAPARE - Transportation     Lack of Transportation (Medical): No     Lack of Transportation (Non-Medical): No   Physical Activity: Inactive (11/1/2023)    Received from [facility]    Exercise Vital Sign     Days of Exercise per Week: 0 days     Minutes of Exercise per Session: 0 min   Stress: No Stress Concern Present (11/4/2024)    Received from [facility]    [place] [employer] - Occupational Stress Questionnaire     Feeling of Stress : Only a little   Social Connections: Feeling Socially Integrated (11/4/2024)    Received from [facility]    OASIS : Social Isolation     How often do you feel lonely or isolated from those around you?: Rarely   Intimate Partner Violence: Not At Risk (11/4/2024)    Received from [facility]    Humiliation, Afraid, Rape, and Kick questionnaire     Fear of Current or Ex-Partner: No     Emotionally Abused: No     Physically Abused: No     Sexually Abused: No   Housing Stability: Low Risk  (11/4/2024)    Received from  "[facility]    Housing Stability Vital Sign     Unable to Pay for Housing in the Last Year: No     Number of Times Moved in the Last Year: 0     Homeless in the Last Year: No      Family History   Problem Relation Age of Onset    Hypertension Father     Diabetes Father      Past Surgical History:   Procedure Laterality Date    KNEE ARTHROSCOPY         Current Outpatient Medications:     amLODIPine (NORVASC) 10 mg tablet, Take 1 tablet (10 mg total) by mouth daily, Disp: 90 tablet, Rfl: 3    cholecalciferol (VITAMIN D3) 1,000 units tablet, Take 2,000 Units by mouth daily, Disp: , Rfl:     doxazosin (CARDURA) 1 mg tablet, Take 1 tablet (1 mg total) by mouth daily at bedtime, Disp: 30 tablet, Rfl: 6    fenofibrate 160 MG tablet, Take 1 tablet (160 mg total) by mouth daily, Disp: 90 tablet, Rfl: 3    lisinopril (ZESTRIL) 10 mg tablet, Take 20 mg by mouth daily , Disp: , Rfl:     omeprazole (PriLOSEC) 20 mg delayed release capsule, Take 20 mg by mouth daily, Disp: , Rfl:   Allergies   Allergen Reactions    Sulfa Antibiotics      As a child       Imaging: No results found.    Review of Systems:  Review of Systems   Constitutional: Negative.    HENT: Negative.     Eyes: Negative.    Respiratory: Negative.     Cardiovascular: Negative.    Endocrine: Negative.    Musculoskeletal: Negative.        Physical Exam:  /60 (BP Location: Left arm, Patient Position: Sitting, Cuff Size: Large)   Pulse 80   Ht 5' 6\" (1.676 m)   SpO2 95%   BMI 29.38 kg/m²   Physical Exam  Constitutional:       General: He is not in acute distress.     Appearance: He is not ill-appearing or toxic-appearing.   HENT:      Nose: Nose normal. No congestion or rhinorrhea.      Mouth/Throat:      Mouth: Mucous membranes are moist.      Pharynx: No oropharyngeal exudate or posterior oropharyngeal erythema.   Neck:      Vascular: No carotid bruit.   Cardiovascular:      Rate and Rhythm: Normal rate.      Heart sounds: No murmur " "heard.     No friction rub. No gallop.   Pulmonary:      Effort: Pulmonary effort is normal. No respiratory distress.      Breath sounds: No wheezing or rhonchi.   Musculoskeletal:         General: No tenderness, deformity or signs of injury. Normal range of motion.      Cervical back: Normal range of motion. No rigidity or tenderness.   Lymphadenopathy:      Cervical: No cervical adenopathy.   Skin:     General: Skin is warm.      Coloration: Skin is not jaundiced or pale.      Findings: No bruising or erythema.   Neurological:      Mental Status: He is alert.         This note was completed in part utilizing [employer] direct voice recognition software.   Grammatical errors, random word insertion, spelling mistakes, occasional wrong word or \"sound-alike\" substitutions and incomplete sentences may be an occasional consequence of the system secondary to software limitations, ambient noise and hardware issues. At the time of dictation, efforts were made to edit, clarify and /or correct errors.  Please read the chart carefully and recognize, using context, where substitutions have occurred.  If you have any questions or concerns about the context, text or information contained within the body of this dictation, please contact myself, the provider, for further clarification.  "

## 2024-12-13 ENCOUNTER — PROCEDURE VISIT (OUTPATIENT)
Dept: OBGYN CLINIC | Facility: CLINIC | Age: 79
End: 2024-12-13
Payer: MEDICARE

## 2024-12-13 VITALS
SYSTOLIC BLOOD PRESSURE: 143 MMHG | HEART RATE: 76 BPM | BODY MASS INDEX: 29.25 KG/M2 | DIASTOLIC BLOOD PRESSURE: 65 MMHG | WEIGHT: 182 LBS | HEIGHT: 66 IN

## 2024-12-13 DIAGNOSIS — M17.12 PRIMARY OSTEOARTHRITIS OF LEFT KNEE: Primary | ICD-10-CM

## 2024-12-13 PROCEDURE — 20610 DRAIN/INJ JOINT/BURSA W/O US: CPT | Performed by: ORTHOPAEDIC SURGERY

## 2024-12-13 RX ORDER — HYDROCHLOROTHIAZIDE 25 MG/1
25 TABLET ORAL DAILY
COMMUNITY
Start: 2024-12-12

## 2024-12-13 NOTE — PROGRESS NOTES
Assessment/Plan:   Diagnoses and all orders for this visit:    Primary osteoarthritis of left knee  -     Large joint arthrocentesis: L knee    Other orders  -     hydroCHLOROthiazide 25 mg tablet; Take 25 mg by mouth daily       The patient has a history of left knee osteoarthritis. He was offered and accepted Orthovisc visco supplementation injection series. An injection of Orthovisc was performed to his Left knee(s) for symptomatic relief. He tolerated the procedure well.  Ice and post injection protocol was advised. Weightbearing activities, as tolerated. He will return in 3 months for re-evaluation. Patient expresses understanding and is in agreement with this treatment plan. The patient was given the opportunity to ask questions or present concerns.     The patient has osteoarthritis of his left knee.  Under aseptic technique, the left knee was injected with his third set of Orthovisc.  He tolerated the procedure well.  Return back 3 months for strength and motion check      Subjective:   Patient ID: Inés Frey  1945     HPI  Patient is a 78 y.o. male who presents for re-evaluation and completion of Orthovisc visco supplementation injection series for treatment of primary osteoarthritis of the Left knee(s). On today's presentation he reports having experienced moderate interval improvement. He rates his pain level at 3/10. Patient denies any adverse reaction to previous injections including fever, chills, headache, nausea, dizziness, or malaise.      The following portions of the patient's history were reviewed and updated as appropriate:  Past medical history, past surgical history, Family history, social history, current medications and allergies    Past Medical History:   Diagnosis Date    GERD (gastroesophageal reflux disease)     Hypertension        Past Surgical History:   Procedure Laterality Date    KNEE ARTHROSCOPY         Family History   Problem Relation Age of Onset    Hypertension Father      Diabetes Father        Social History     Socioeconomic History    Marital status: Single     Spouse name: None    Number of children: None    Years of education: None    Highest education level: None   Occupational History    None   Tobacco Use    Smoking status: Never     Passive exposure: Never    Smokeless tobacco: Never   Vaping Use    Vaping status: Never Used   Substance and Sexual Activity    Alcohol use: Yes     Comment: 1-2 drinks a month    Drug use: Never    Sexual activity: Not Currently   Other Topics Concern    None   Social History Narrative    Caffeine use- coffee on occasion     Social Drivers of Health     Financial Resource Strain: Low Risk  (11/4/2024)    Received from [facility]    Overall Financial Resource Strain (CARDIA)     Difficulty of Paying Living Expenses: Not hard at all   Food Insecurity: No Food Insecurity (11/4/2024)    Received from [facility]    Hunger Vital Sign     Worried About Running Out of Food in the Last Year: Never true     Ran Out of Food in the Last Year: Never true   Transportation Needs: No Transportation Needs (11/4/2024)    Received from [facility]    PRAPARE - Transportation     Lack of Transportation (Medical): No     Lack of Transportation (Non-Medical): No   Physical Activity: Inactive (11/1/2023)    Received from [facility]    Exercise Vital Sign     Days of Exercise per Week: 0 days     Minutes of Exercise per Session: 0 min   Stress: No Stress Concern Present (11/4/2024)    Received from [facility]    [employer] [place] [employer] - Occupational Stress Questionnaire     Feeling of Stress : Only a little   Social Connections: Feeling Socially Integrated (11/4/2024)    Received from [facility]    OASIS : Social Isolation     How often do you feel lonely or isolated from those around you?: Rarely   Intimate Partner Violence: Not At Risk  (11/4/2024)    Received from [facility]    Humiliation, Afraid, Rape, and Kick questionnaire     Fear of Current or Ex-Partner: No     Emotionally Abused: No     Physically Abused: No     Sexually Abused: No   Housing Stability: Low Risk  (11/4/2024)    Received from [facility]    Housing Stability Vital Sign     Unable to Pay for Housing in the Last Year: No     Number of Times Moved in the Last Year: 0     Homeless in the Last Year: No         Current Outpatient Medications:     amLODIPine (NORVASC) 10 mg tablet, Take 1 tablet (10 mg total) by mouth daily, Disp: 90 tablet, Rfl: 3    cholecalciferol (VITAMIN D3) 1,000 units tablet, Take 2,000 Units by mouth daily, Disp: , Rfl:     doxazosin (CARDURA) 1 mg tablet, Take 1 tablet (1 mg total) by mouth daily at bedtime, Disp: 30 tablet, Rfl: 6    fenofibrate 160 MG tablet, Take 1 tablet (160 mg total) by mouth daily, Disp: 90 tablet, Rfl: 3    hydroCHLOROthiazide 25 mg tablet, Take 25 mg by mouth daily, Disp: , Rfl:     lisinopril (ZESTRIL) 20 mg tablet, Take 1 tablet (20 mg total) by mouth daily, Disp: 90 tablet, Rfl: 3    omeprazole (PriLOSEC) 20 mg delayed release capsule, Take 20 mg by mouth daily, Disp: , Rfl:     Allergies   Allergen Reactions    Sulfa Antibiotics      As a child       Review of Systems   Constitutional:  Negative for chills and fever.   HENT:  Negative for ear pain and sore throat.    Eyes:  Negative for pain and visual disturbance.   Respiratory:  Negative for cough and shortness of breath.    Cardiovascular:  Negative for chest pain and palpitations.   Gastrointestinal:  Negative for abdominal pain and vomiting.   Genitourinary:  Negative for dysuria and hematuria.   Musculoskeletal:  Negative for arthralgias and back pain.   Skin:  Negative for color change and rash.   Neurological:  Negative for seizures and syncope.   All other systems reviewed and are negative.       Objective:  /65 (BP Location:  "Left arm, Patient Position: Sitting, Cuff Size: Large)   Pulse 76   Ht 5' 6\" (1.676 m)   Wt 82.6 kg (182 lb)   BMI 29.38 kg/m²     Ortho Exam  left knee(s) -   Patient ambulates with steady gait pattern  Uses No assistive device  Genu Varus anatomical deformity  Skin is warm and dry to touch with no signs of erythema, ecchymosis, or infection   No soft tissue swelling or effusion noted  ROM (0° - 115°)   Strength: 5/5 throughout  TTP over medial joint line, TTP over lateral joint line, TTP over pes anserine bursa, no popliteal fullness appreciated on exam   Flexor and extensor mechanisms are intact   Knee is stable to varus and valgus stress  - Lachman's  - Anterior Drawer, - Posterior Drawer  - Ry's  Patella tracks centrally with palpable crepitus  Calf compartments are soft and supple  - Sanya's sign  2+ DP and PT pulses with brisk capillary refill to the toes  Sural, saphenous, tibial, superficial, and deep peroneal motor and sensory distributions intact  Sensation light touch intact distally      Physical Exam  HENT:      Head: Normocephalic and atraumatic.      Nose: Nose normal.   Eyes:      Conjunctiva/sclera: Conjunctivae normal.   Cardiovascular:      Rate and Rhythm: Normal rate.   Pulmonary:      Effort: Pulmonary effort is normal.   Musculoskeletal:      Cervical back: Neck supple.   Skin:     General: Skin is warm and dry.      Capillary Refill: Capillary refill takes less than 2 seconds.   Neurological:      Mental Status: He is alert and oriented to person, place, and time.   Psychiatric:         Mood and Affect: Mood normal.         Behavior: Behavior normal.          Diagnostic Test Review:  No new imaging at time of visit.     Large joint arthrocentesis: L knee  Universal Protocol:  procedure performed by consultantConsent: Verbal consent obtained.  Risks and benefits: risks, benefits and alternatives were discussed  Consent given by: patient  Timeout called at: 12/13/2024 11:00 " AM.  Patient understanding: patient states understanding of the procedure being performed  Patient consent: the patient's understanding of the procedure matches consent given  Site marked: the operative site was marked  Radiology Images displayed and confirmed. If images not available, report reviewed: imaging studies available  Patient identity confirmed: verbally with patient  Supporting Documentation  Indications: pain and joint swelling   Procedure Details  Location: knee - L knee  Needle gauge: 21 G.  Ultrasound guidance: no  Approach: anterolateral  Medications administered: 30 mg sodium hyaluronate 30 mg/2 mL    Patient tolerance: patient tolerated the procedure well with no immediate complications  Dressing:  Sterile dressing applied             Scribe Attestation      I,:  Tiffany Christianson am acting as a scribe while in the presence of the attending physician.:       I,:  Barrington Rivas DO personally performed the services described in this documentation    as scribed in my presence.:

## 2025-03-14 ENCOUNTER — OFFICE VISIT (OUTPATIENT)
Dept: OBGYN CLINIC | Facility: CLINIC | Age: 80
End: 2025-03-14
Payer: MEDICARE

## 2025-03-14 VITALS — BODY MASS INDEX: 29.25 KG/M2 | WEIGHT: 182 LBS | HEIGHT: 66 IN

## 2025-03-14 DIAGNOSIS — M17.12 PRIMARY OSTEOARTHRITIS OF LEFT KNEE: Primary | ICD-10-CM

## 2025-03-14 PROCEDURE — 99213 OFFICE O/P EST LOW 20 MIN: CPT | Performed by: ORTHOPAEDIC SURGERY

## 2025-03-14 PROCEDURE — 20610 DRAIN/INJ JOINT/BURSA W/O US: CPT | Performed by: ORTHOPAEDIC SURGERY

## 2025-03-14 RX ORDER — TRIAMCINOLONE ACETONIDE 40 MG/ML
80 INJECTION, SUSPENSION INTRA-ARTICULAR; INTRAMUSCULAR
Status: COMPLETED | OUTPATIENT
Start: 2025-03-14 | End: 2025-03-14

## 2025-03-14 RX ORDER — BUPIVACAINE HYDROCHLORIDE 2.5 MG/ML
4 INJECTION, SOLUTION INFILTRATION; PERINEURAL
Status: COMPLETED | OUTPATIENT
Start: 2025-03-14 | End: 2025-03-14

## 2025-03-14 RX ADMIN — TRIAMCINOLONE ACETONIDE 80 MG: 40 INJECTION, SUSPENSION INTRA-ARTICULAR; INTRAMUSCULAR at 11:00

## 2025-03-14 RX ADMIN — BUPIVACAINE HYDROCHLORIDE 4 ML: 2.5 INJECTION, SOLUTION INFILTRATION; PERINEURAL at 11:00

## 2025-03-14 NOTE — PROGRESS NOTES
Assessment & Plan  Primary osteoarthritis of left knee    Orders:    Large joint arthrocentesis: L knee  Reviewed physical exam with the patient at time of visit. The patient experienced a flare-up in symptoms related to left knee osteoarthritis. An injection(s) of kenalog and marcaine was performed to his Left knee(s) for symptomatic relief of pain and inflammation. Patient tolerated the treatment(s) well. Ice and post injection protocol advised. Weightbearing activities as tolerated. He will be seen for follow-up in 3 months for re-evaluation and consideration for repeat injections as necessary. Patient expresses understanding and is in agreement with this treatment plan. The patient was given the opportunity to ask questions or present concerns.      The patient has osteoarthritis of his left knee.  He is opted for an elective injection of his left knee which occurred with Kenalog and Marcaine out any incidence.  He tolerated the procedure well.  Return back 3 months for reevaluation    Subjective:   Patient ID: Inés Frey  1945     HPI  Patient is a 79 y.o. male who presents for follow-up evaluation of his Left knee(s). 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 The patient reports relief following the previous CS injection. However, the pain returned approximately 1-3 weeks ago. On today's presentation, he reports pain along the medial aspect of his knee(s). He states that the pain is exacerbated by weight bearing activities, knee flexion, and ambulating stairs. He denies feelings of instability or weakness. He denies numbness or tingling.        The following portions of the patient's history were reviewed and updated as appropriate:  Past medical history, past surgical history, Family history, social history, current medications and allergies    Past Medical History:   Diagnosis Date    GERD (gastroesophageal  reflux disease)     Hypertension        Past Surgical History:   Procedure Laterality Date    KNEE ARTHROSCOPY         Family History   Problem Relation Age of Onset    Hypertension Father     Diabetes Father        Social History     Socioeconomic History    Marital status: Single     Spouse name: None    Number of children: None    Years of education: None    Highest education level: None   Occupational History    None   Tobacco Use    Smoking status: Never     Passive exposure: Never    Smokeless tobacco: Never   Vaping Use    Vaping status: Never Used   Substance and Sexual Activity    Alcohol use: Yes     Comment: 1-2 drinks a month    Drug use: Never    Sexual activity: Not Currently   Other Topics Concern    None   Social History Narrative    Caffeine use- coffee on occasion     Social Drivers of Health     Financial Resource Strain: Low Risk  (11/4/2024)    Received from [facility]    Overall Financial Resource Strain (CARDIA)     Difficulty of Paying Living Expenses: Not hard at all   Food Insecurity: No Food Insecurity (11/4/2024)    Received from [facility]    Hunger Vital Sign     Worried About Running Out of Food in the Last Year: Never true     Ran Out of Food in the Last Year: Never true   Transportation Needs: No Transportation Needs (11/4/2024)    Received from [facility]    PRAPARE - Transportation     Lack of Transportation (Medical): No     Lack of Transportation (Non-Medical): No   Physical Activity: Inactive (11/1/2023)    Received from [facility]    Exercise Vital Sign     Days of Exercise per Week: 0 days     Minutes of Exercise per Session: 0 min   Stress: No Stress Concern Present (11/4/2024)    Received from [facility]    [employer] [place] [employer] - Occupational Stress Questionnaire     Feeling of Stress : Only a little   Social Connections: Feeling Socially Integrated (11/4/2024)     Received from [facility]    OASIS : Social Isolation     How often do you feel lonely or isolated from those around you?: Rarely   Intimate Partner Violence: Not At Risk (11/4/2024)    [facility] from [facility]    Humiliation, Afraid, Rape, and Kick questionnaire     Fear of Current or Ex-Partner: No     Emotionally Abused: No     Physically Abused: No     Sexually Abused: No   Housing Stability: Low Risk  (11/4/2024)    Received from [facility]    Housing Stability Vital Sign     Unable to Pay for Housing in the Last Year: No     Number of Times Moved in the Last Year: 0     Homeless in the Last Year: No         Current Outpatient Medications:     amLODIPine (NORVASC) 10 mg tablet, Take 1 tablet (10 mg total) by mouth daily, Disp: 90 tablet, Rfl: 3    cholecalciferol (VITAMIN D3) 1,000 units tablet, Take 2,000 Units by mouth daily, Disp: , Rfl:     doxazosin (CARDURA) 1 mg tablet, Take 1 tablet (1 mg total) by mouth daily at bedtime, Disp: 30 tablet, Rfl: 6    fenofibrate 160 MG tablet, Take 1 tablet (160 mg total) by mouth daily, Disp: 90 tablet, Rfl: 3    hydroCHLOROthiazide 25 mg tablet, Take 25 mg by mouth daily, Disp: , Rfl:     lisinopril (ZESTRIL) 20 mg tablet, Take 1 tablet (20 mg total) by mouth daily, Disp: 90 tablet, Rfl: 3    omeprazole (PriLOSEC) 20 mg delayed release capsule, Take 20 mg by mouth daily, Disp: , Rfl:     Allergies   Allergen Reactions    Sulfa Antibiotics      As a child       Review of Systems   Constitutional:  Negative for chills, fever and unexpected weight change.   HENT:  Negative for ear pain, hearing loss, nosebleeds and sore throat.    Eyes:  Negative for pain, redness and visual disturbance.   Respiratory:  Negative for cough, shortness of breath and wheezing.    Cardiovascular:  Negative for chest pain, palpitations and leg swelling.   Gastrointestinal:  Negative for abdominal pain, nausea  "and vomiting.   Endocrine: Negative for polydipsia and polyuria.   Genitourinary:  Negative for dysuria and hematuria.   Musculoskeletal:  Negative for arthralgias and back pain.        As noted in HPI   Skin:  Negative for color change, rash and wound.   Neurological:  Negative for dizziness, seizures, syncope, numbness and headaches.   Psychiatric/Behavioral:  Negative for decreased concentration and suicidal ideas. The patient is not nervous/anxious.    All other systems reviewed and are negative.       Objective:  Ht 5' 6\" (1.676 m)   Wt 82.6 kg (182 lb) Comment: reported  BMI 29.38 kg/m²     Ortho Exam  left knee(s) -   Patient ambulates with steady gait pattern  Uses No assistive device  No anatomical deformity  Skin is warm and dry to touch with no signs of erythema, ecchymosis, or infection   Mild generalized soft tissue swelling or effusion noted  ROM (0° - 115°)   Strength: 5/5 throughout  TTP over medial joint line  Flexor and extensor mechanisms are intact   Knee is stable to varus and valgus stress  - Lachman's  - Anterior Drawer, - Posterior Drawer  - Ry's  Patella tracks centrally with palpable crepitus  Calf compartments are soft and supple  - Sanya's sign  2+ DP and PT pulses with brisk capillary refill to the toes  Sural, saphenous, tibial, superficial, and deep peroneal motor and sensory distributions intact  Sensation light touch intact distally      Physical Exam  HENT:      Head: Normocephalic and atraumatic.      Nose: Nose normal.   Eyes:      Conjunctiva/sclera: Conjunctivae normal.   Cardiovascular:      Rate and Rhythm: Normal rate.   Pulmonary:      Effort: Pulmonary effort is normal.   Musculoskeletal:      Cervical back: Neck supple.   Skin:     General: Skin is warm and dry.      Capillary Refill: Capillary refill takes less than 2 seconds.   Neurological:      Mental Status: He is alert and oriented to person, place, and time.   Psychiatric:         Mood and Affect: Mood " normal.         Behavior: Behavior normal.          Diagnostic Test Review:  No new imaging at time of visit.    Large joint arthrocentesis: L knee  Universal Protocol:  procedure performed by consultantConsent: Verbal consent obtained.  Risks and benefits: risks, benefits and alternatives were discussed  Consent given by: patient  Timeout called at: 3/14/2025 11:27 AM.  Patient understanding: patient states understanding of the procedure being performed  Patient consent: the patient's understanding of the procedure matches consent given  Site marked: the operative site was marked  Radiology Images displayed and confirmed. If images not available, report reviewed: imaging studies available  Patient identity confirmed: verbally with patient  Supporting Documentation  Indications: pain and joint swelling   Procedure Details  Location: knee - L knee  Needle gauge: 21 G.  Ultrasound guidance: no  Approach: anterolateral  Medications administered: 4 mL bupivacaine 0.25 %; 80 mg triamcinolone acetonide 40 mg/mL    Patient tolerance: patient tolerated the procedure well with no immediate complications  Dressing:  Sterile dressing applied             Scribe Attestation      I,:  Tiffany Christianson am acting as a scribe while in the presence of the attending physician.:       I,:  Barrington Rivas DO personally performed the services described in this documentation    as scribed in my presence.:

## 2025-06-13 VITALS — HEIGHT: 66 IN | WEIGHT: 182 LBS | BODY MASS INDEX: 29.25 KG/M2

## 2025-06-13 DIAGNOSIS — M17.12 PRIMARY OSTEOARTHRITIS OF LEFT KNEE: Primary | ICD-10-CM

## 2025-06-13 PROCEDURE — 99213 OFFICE O/P EST LOW 20 MIN: CPT | Performed by: ORTHOPAEDIC SURGERY

## 2025-06-13 PROCEDURE — 20610 DRAIN/INJ JOINT/BURSA W/O US: CPT | Performed by: ORTHOPAEDIC SURGERY

## 2025-06-13 RX ORDER — BUPIVACAINE HYDROCHLORIDE 2.5 MG/ML
3.5 INJECTION, SOLUTION INFILTRATION; PERINEURAL
Status: COMPLETED | OUTPATIENT
Start: 2025-06-13 | End: 2025-06-13

## 2025-06-13 RX ORDER — TRIAMCINOLONE ACETONIDE 40 MG/ML
1.5 INJECTION, SUSPENSION INTRA-ARTICULAR; INTRAMUSCULAR
Status: COMPLETED | OUTPATIENT
Start: 2025-06-13 | End: 2025-06-13

## 2025-06-13 RX ADMIN — TRIAMCINOLONE ACETONIDE 1.5 ML: 40 INJECTION, SUSPENSION INTRA-ARTICULAR; INTRAMUSCULAR at 11:15

## 2025-06-13 RX ADMIN — BUPIVACAINE HYDROCHLORIDE 3.5 ML: 2.5 INJECTION, SOLUTION INFILTRATION; PERINEURAL at 11:15

## 2025-06-13 NOTE — PATIENT INSTRUCTIONS
Patient Education     Hamstring Stretches   About this topic   The hamstrings are a group of 3 muscles in the back of your thigh. These muscles can get tight and are often injured while playing sports. There are many ways to stretch the hamstrings.  General   Before starting with a program, ask your doctor if you are healthy enough to do these exercises. Your doctor may have you work with a  or physical therapist to make a safe exercise program to meet your needs.  Stretching Exercises   Stretching exercises keep your muscles flexible. They also stop them from getting tight. Start by doing each of these stretches 2 to 3 times. In order for your body to make changes, you will need to hold these stretches for 20 to 30 seconds. Repeat each exercise 2 to 3 times each day. Do all exercises slowly.  Hamstring stretches seated ? Sit up straight on the edge of a chair. Make sure you keep your back straight. Straighten your knee on your left leg. Keep your heel on the floor. Bend forward at the waist towards your foot while keeping your upper back straight. Bend forward until you feel a stretch in the back of your thigh. Repeat on the other leg.  Hamstring stretches on back ? Lie on your back with both knees bent and feet flat on the floor. Grab the back of your left thigh. Straighten your knee until you feel a stretch at the back of your thigh. Now, pull your toes down towards your head. Repeat on the other leg.  Hamstring stretches lying down with belt or towel ? Lie on your back with both legs straight and toes pointed upward. Loop a belt or towel around the ball of one foot. Lift up your leg, keeping the knee straight until you feel a stretch in the back of your thigh. Pull down on the belt or towel to bend your foot more for a better stretch. Repeat on the other side.  Hamstring stretches in doorway ? Lie on your back near a doorway. Make sure others will not walk by while you are stretching. One leg should be on  the floor through the doorway with your knee straight. Put the other leg up on the wall with your knee straight. Scoot forward until you feel a stretch in the back of the thigh. Bend your foot down towards your head for a better stretch. Repeat on the other side.  Hamstring stretches sitting on floor ? Sit on the floor. Put one leg straight out towards the side as if you were getting into a straddle position. Bend the other leg at the knee so that the foot is touching your other thigh. Keep your back straight and slowly lean towards the foot of the straight leg until you feel a stretch in the back of your thigh. Bend your foot up towards your head for a better stretch. Repeat on the other side.  Hamstring stretches standing ? Stand with your feet shoulder width apart. Straighten one leg about a foot in front of the other. Keeping your front leg straight, bend the knee on your back leg. At the same time, keep your back straight and lean forward at the waist. You should feel a stretch on the back of your thigh. Pull the toes up on your front foot. Repeat on the other side.               What will the results be?   Less pain and stiffness  Better flexibility and range of motion  Less cramping  Preventing lower back pain  Improves posture  Easier to do daily activities  Less chance of injury during sports  Helpful tips   Stay active and work out to keep your muscles strong and flexible.  Keep a healthy weight so there is not extra stress on your joints. Eat a healthy diet to keep your muscles healthy.  Be sure you do not hold your breath when exercising. This can raise your blood pressure. If you tend to hold your breath, try counting out loud when exercising. If any exercise bothers you, stop right away.  Always warm up before stretching. Heated muscles stretch much easier than cool muscles. Stretching cool muscles can lead to injury.  Try walking or cycling at an easy pace for a few minutes to warm up your muscles. Do  this again after exercising.  Never bounce when doing stretches.  Doing exercises before a meal may be a good way to get into a routine.  Exercise may be slightly uncomfortable, but you should not have sharp pains. If you do get sharp pains, stop what you are doing. If the sharp pains continue, call your doctor.  Last Reviewed Date   2021-07-26  Consumer Information Use and Disclaimer   This generalized information is a limited summary of diagnosis, treatment, and/or medication information. It is not meant to be comprehensive and should be used as a tool to help the user understand and/or assess potential diagnostic and treatment options. It does NOT include all information about conditions, treatments, medications, side effects, or risks that may apply to a specific patient. It is not intended to be medical advice or a substitute for the medical advice, diagnosis, or treatment of a health care provider based on the health care provider's examination and assessment of a patient’s specific and unique circumstances. Patients must speak with a health care provider for complete information about their health, medical questions, and treatment options, including any risks or benefits regarding use of medications. This information does not endorse any treatments or medications as safe, effective, or approved for treating a specific patient. [employer] and its affiliates disclaim any warranty or liability relating to this information or the use thereof. The use of this information is governed by the Terms of Use, available at https://www.[employer].com/en/know/clinical-effectiveness-terms   Copyright   Copyright © 2024 [employer] and its affiliates and/or licensors. All rights reserved.

## 2025-06-13 NOTE — PROGRESS NOTES
Assessment & Plan  Primary osteoarthritis of left knee    Orders:    Large joint arthrocentesis: L knee      The patient has osteoarthritis of his left knee.  He is opted for an elective injection of his left knee which occurred with Kenalog and Marcaine.  He tolerated the procedure well. The patient was provided hamstring stretches to performed at home. Return back 3 months for reevaluation.    The patient has osteoarthritis of his left knee.  Under aseptic technique, the left knee was injected with Kenalog and Marcaine.  He tolerated procedure well.  We will get approved viscosupplementation.  Return back once complete.  He was also given stretches for his hamstrings    Subjective:   Patient ID: Inés Frey  1945     HPI  Patient is a 79 y.o. male who presents for follow-up evaluation of his Left knee(s). The patient was last seen on 3/14/2025 where he received a CS injection for treatment of osteoarthritis of his Left knee(s). The patient reports relief following the previous CS injection.  However, the pain returned approximately 1-3 weeks ago. On today's presentation, he reports pain along the medial aspect of his knee(s). He is also having posterior thigh and buttock pain. He states that the pain is exacerbated by weight bearing activities, knee flexion, and ambulating stairs. He denies feelings of instability or weakness. He denies numbness or tingling.        The following portions of the patient's history were reviewed and updated as appropriate:  Past medical history, past surgical history, Family history, social history, current medications and allergies    Past Medical History:   Diagnosis Date    GERD (gastroesophageal reflux disease)     Hypertension        Past Surgical History:   Procedure Laterality Date    KNEE ARTHROSCOPY         Family History   Problem Relation Name Age of Onset    Hypertension Father      Diabetes Father         Social History     Socioeconomic History    Marital  status: Single   Tobacco Use    Smoking status: Never     Passive exposure: Never    Smokeless tobacco: Never   Vaping Use    Vaping status: Never Used   Substance and Sexual Activity    Alcohol use: Yes     Comment: 1-2 drinks a month    Drug use: Never    Sexual activity: Not Currently   Social History Narrative    Caffeine use- coffee on occasion     Social Drivers of Health     Financial Resource Strain: Low Risk  (11/4/2024)    Received from [facility]    Overall Financial Resource Strain (CARDIA)     Difficulty of Paying Living Expenses: Not hard at all   Food Insecurity: No Food Insecurity (11/4/2024)    Received from [facility]    Hunger Vital Sign     Within the past 12 months, you worried that your food would run out before you got the money to buy more.: Never true     Within the past 12 months, the food you bought just didn't last and you didn't have money to get more.: Never true   Transportation Needs: No Transportation Needs (11/4/2024)    Received from [facility]    PRAPARE - Transportation     Lack of Transportation (Medical): No     Lack of Transportation (Non-Medical): No   Physical Activity: Inactive (11/1/2023)    Received from [facility]    Exercise Vital Sign     Days of Exercise per Week: 0 days     Minutes of Exercise per Session: 0 min   Stress: No Stress Concern Present (11/4/2024)    Received from [facility]    [place] citizen [place] [employer] - Occupational Stress Questionnaire     Feeling of Stress : Only a little   Social Connections: Feeling Socially Integrated (11/4/2024)    Received from [facility]    OASIS : Social Isolation     How often do you feel lonely or isolated from those around you?: Rarely   Intimate Partner Violence: Not At Risk (11/4/2024)    Received from [facility]    Humiliation, Afraid, Rape, and Kick questionnaire     Within the  last year, have you been afraid of your partner or ex-partner?: No     Within the last year, have you been humiliated or emotionally abused in other ways by your partner or ex-partner?: No     Within the last year, have you been kicked, hit, slapped, or otherwise physically hurt by your partner or ex-partner?: No     Within the last year, have you been raped or forced to have any kind of sexual activity by your partner or ex-partner?: No   Housing Stability: Low Risk  (11/4/2024)    Received from [facility]    Housing Stability Vital Sign     In the last 12 months, was there a time when you were not able to pay the mortgage or rent on time?: No     In the past 12 months, how many times have you moved where you were living?: 0     At any time in the past 12 months, were you homeless or living in a shelter (including now)?: No         Current Outpatient Medications:     amLODIPine (NORVASC) 10 mg tablet, Take 1 tablet (10 mg total) by mouth daily, Disp: 90 tablet, Rfl: 3    cholecalciferol (VITAMIN D3) 1,000 units tablet, Take 2,000 Units by mouth in the morning., Disp: , Rfl:     doxazosin (CARDURA) 1 mg tablet, Take 1 tablet (1 mg total) by mouth daily at bedtime, Disp: 30 tablet, Rfl: 6    fenofibrate 160 MG tablet, Take 1 tablet (160 mg total) by mouth daily, Disp: 90 tablet, Rfl: 3    hydroCHLOROthiazide 25 mg tablet, Take 25 mg by mouth in the morning., Disp: , Rfl:     lisinopril (ZESTRIL) 20 mg tablet, Take 1 tablet (20 mg total) by mouth daily, Disp: 90 tablet, Rfl: 3    omeprazole (PriLOSEC) 20 mg delayed release capsule, Take 20 mg by mouth in the morning., Disp: , Rfl:     Allergies   Allergen Reactions    Sulfa Antibiotics      As a child       Review of Systems   Constitutional:  Negative for chills, fever and unexpected weight change.   HENT:  Negative for ear pain, hearing loss, nosebleeds and sore throat.    Eyes:  Negative for pain, redness and visual disturbance.   Respiratory:   "Negative for cough, shortness of breath and wheezing.    Cardiovascular:  Negative for chest pain, palpitations and leg swelling.   Gastrointestinal:  Negative for abdominal pain, nausea and vomiting.   Endocrine: Negative for polydipsia and polyuria.   Genitourinary:  Negative for dysuria and hematuria.   Musculoskeletal:  Negative for arthralgias and back pain.        As noted in HPI   Skin:  Negative for color change, rash and wound.   Neurological:  Negative for dizziness, seizures, syncope, numbness and headaches.   Psychiatric/Behavioral:  Negative for decreased concentration and suicidal ideas. The patient is not nervous/anxious.    All other systems reviewed and are negative.       Objective:  Ht 5' 6\" (1.676 m)   Wt 82.6 kg (182 lb)   BMI 29.38 kg/m²     Ortho Exam  left knee(s) -   Patient ambulates with steady gait pattern  Uses No assistive device  No anatomical deformity  Skin is warm and dry to touch with no signs of erythema, ecchymosis, or infection   Mild generalized soft tissue swelling or effusion noted  ROM (0° - 115°)   Strength: 5/5 throughout  TTP over medial joint line  Flexor and extensor mechanisms are intact   Knee is stable to varus and valgus stress  - Lachman's  - Anterior Drawer, - Posterior Drawer  - Ry's  Patella tracks centrally with palpable crepitus  Calf compartments are soft and supple  - Sanya's sign  2+ DP and PT pulses with brisk capillary refill to the toes  Sural, saphenous, tibial, superficial, and deep peroneal motor and sensory distributions intact  Sensation light touch intact distally      Physical Exam  HENT:      Head: Normocephalic and atraumatic.      Nose: Nose normal.     Eyes:      Conjunctiva/sclera: Conjunctivae normal.       Cardiovascular:      Rate and Rhythm: Normal rate.   Pulmonary:      Effort: Pulmonary effort is normal.     Musculoskeletal:      Cervical back: Neck supple.     Skin:     General: Skin is warm and dry.      Capillary Refill: " "Capillary refill takes less than 2 seconds.     Neurological:      Mental Status: He is alert and oriented to person, place, and time.     Psychiatric:         Mood and Affect: Mood normal.         Behavior: Behavior normal.          Diagnostic Test Review:  No new imaging at time of visit.    Large joint arthrocentesis: L knee    Performed by: Barrington Rivas DO  Authorized by: Barrington Rivas DO    Universal Protocol:  Consent: Verbal consent obtained  Risks and benefits: risks, benefits and alternatives were discussed  Consent given by: patient  Time out: Immediately prior to procedure a \"time out\" was called to verify the correct patient, procedure, equipment, support staff and site/side marked as required.  Patient understanding: patient states understanding of the procedure being performed  Site marked: the operative site was marked  Patient identity confirmed: verbally with patient  Supporting Documentation  Indications: pain     Is this a Visco injection? NoProcedure Details  Location: knee - L knee  Preparation: Patient was prepped and draped in the usual sterile fashion  Needle size: 22 G  Ultrasound guidance: no  Approach: anterolateral  Medications administered: 3.5 mL bupivacaine 0.25 %; 1.5 mL triamcinolone acetonide 40 mg/mL    Patient tolerance: patient tolerated the procedure well with no immediate complications  Dressing:  Sterile dressing applied             Scribe Attestation      I,:  Jenelle Martin am acting as a scribe while in the presence of the attending physician.:       I,:  Barrington Rivas DO personally performed the services described in this documentation    as scribed in my presence.:                "